# Patient Record
Sex: MALE | Race: WHITE | NOT HISPANIC OR LATINO | Employment: OTHER | URBAN - METROPOLITAN AREA
[De-identification: names, ages, dates, MRNs, and addresses within clinical notes are randomized per-mention and may not be internally consistent; named-entity substitution may affect disease eponyms.]

---

## 2017-01-15 ENCOUNTER — HOSPITAL ENCOUNTER (OUTPATIENT)
Facility: MEDICAL CENTER | Age: 82
End: 2017-01-18
Attending: EMERGENCY MEDICINE | Admitting: INTERNAL MEDICINE
Payer: MEDICARE

## 2017-01-15 ENCOUNTER — RESOLUTE PROFESSIONAL BILLING HOSPITAL PROF FEE (OUTPATIENT)
Dept: HOSPITALIST | Facility: MEDICAL CENTER | Age: 82
End: 2017-01-15
Payer: MEDICARE

## 2017-01-15 ENCOUNTER — APPOINTMENT (OUTPATIENT)
Dept: RADIOLOGY | Facility: MEDICAL CENTER | Age: 82
End: 2017-01-15
Attending: EMERGENCY MEDICINE
Payer: MEDICARE

## 2017-01-15 DIAGNOSIS — R53.1 GENERALIZED WEAKNESS: ICD-10-CM

## 2017-01-15 DIAGNOSIS — R10.84 GENERALIZED ABDOMINAL PAIN: ICD-10-CM

## 2017-01-15 DIAGNOSIS — K59.00 CONSTIPATION, UNSPECIFIED CONSTIPATION TYPE: ICD-10-CM

## 2017-01-15 PROBLEM — E87.1 HYPONATREMIA: Status: ACTIVE | Noted: 2017-01-15

## 2017-01-15 PROBLEM — E86.0 DEHYDRATION: Status: ACTIVE | Noted: 2017-01-15

## 2017-01-15 LAB
ALBUMIN SERPL BCP-MCNC: 4.3 G/DL (ref 3.2–4.9)
ALBUMIN/GLOB SERPL: 1.3 G/DL
ALP SERPL-CCNC: 59 U/L (ref 30–99)
ALT SERPL-CCNC: 12 U/L (ref 2–50)
ANION GAP SERPL CALC-SCNC: 9 MMOL/L (ref 0–11.9)
AST SERPL-CCNC: 19 U/L (ref 12–45)
BASOPHILS # BLD AUTO: 0.3 % (ref 0–1.8)
BASOPHILS # BLD: 0.03 K/UL (ref 0–0.12)
BILIRUB SERPL-MCNC: 1.2 MG/DL (ref 0.1–1.5)
BUN SERPL-MCNC: 28 MG/DL (ref 8–22)
CALCIUM SERPL-MCNC: 9.8 MG/DL (ref 8.5–10.5)
CHLORIDE SERPL-SCNC: 100 MMOL/L (ref 96–112)
CO2 SERPL-SCNC: 25 MMOL/L (ref 20–33)
CREAT SERPL-MCNC: 1.2 MG/DL (ref 0.5–1.4)
EOSINOPHIL # BLD AUTO: 0.14 K/UL (ref 0–0.51)
EOSINOPHIL NFR BLD: 1.5 % (ref 0–6.9)
ERYTHROCYTE [DISTWIDTH] IN BLOOD BY AUTOMATED COUNT: 44.7 FL (ref 35.9–50)
EST. AVERAGE GLUCOSE BLD GHB EST-MCNC: 174 MG/DL
GFR SERPL CREATININE-BSD FRML MDRD: 57 ML/MIN/1.73 M 2
GLOBULIN SER CALC-MCNC: 3.2 G/DL (ref 1.9–3.5)
GLUCOSE BLD-MCNC: 100 MG/DL (ref 65–99)
GLUCOSE SERPL-MCNC: 171 MG/DL (ref 65–99)
HBA1C MFR BLD: 7.7 % (ref 0–5.6)
HCT VFR BLD AUTO: 38.8 % (ref 42–52)
HGB BLD-MCNC: 12.7 G/DL (ref 14–18)
IMM GRANULOCYTES # BLD AUTO: 0.04 K/UL (ref 0–0.11)
IMM GRANULOCYTES NFR BLD AUTO: 0.4 % (ref 0–0.9)
LACTATE BLD-SCNC: 2.1 MMOL/L (ref 0.5–2)
LIPASE SERPL-CCNC: 44 U/L (ref 11–82)
LYMPHOCYTES # BLD AUTO: 1.34 K/UL (ref 1–4.8)
LYMPHOCYTES NFR BLD: 14 % (ref 22–41)
MCH RBC QN AUTO: 29.7 PG (ref 27–33)
MCHC RBC AUTO-ENTMCNC: 32.7 G/DL (ref 33.7–35.3)
MCV RBC AUTO: 90.7 FL (ref 81.4–97.8)
MONOCYTES # BLD AUTO: 0.86 K/UL (ref 0–0.85)
MONOCYTES NFR BLD AUTO: 9 % (ref 0–13.4)
NEUTROPHILS # BLD AUTO: 7.18 K/UL (ref 1.82–7.42)
NEUTROPHILS NFR BLD: 74.8 % (ref 44–72)
NRBC # BLD AUTO: 0 K/UL
NRBC BLD AUTO-RTO: 0 /100 WBC
PLATELET # BLD AUTO: 177 K/UL (ref 164–446)
PMV BLD AUTO: 10.7 FL (ref 9–12.9)
POTASSIUM SERPL-SCNC: 4.5 MMOL/L (ref 3.6–5.5)
PROT SERPL-MCNC: 7.5 G/DL (ref 6–8.2)
RBC # BLD AUTO: 4.28 M/UL (ref 4.7–6.1)
SODIUM SERPL-SCNC: 134 MMOL/L (ref 135–145)
TROPONIN I SERPL-MCNC: 0.02 NG/ML (ref 0–0.04)
TSH SERPL DL<=0.005 MIU/L-ACNC: 5.17 UIU/ML (ref 0.3–3.7)
WBC # BLD AUTO: 9.6 K/UL (ref 4.8–10.8)

## 2017-01-15 PROCEDURE — 99285 EMERGENCY DEPT VISIT HI MDM: CPT

## 2017-01-15 PROCEDURE — 82962 GLUCOSE BLOOD TEST: CPT

## 2017-01-15 PROCEDURE — A9270 NON-COVERED ITEM OR SERVICE: HCPCS | Performed by: INTERNAL MEDICINE

## 2017-01-15 PROCEDURE — 700112 HCHG RX REV CODE 229: Performed by: INTERNAL MEDICINE

## 2017-01-15 PROCEDURE — 80053 COMPREHEN METABOLIC PANEL: CPT

## 2017-01-15 PROCEDURE — 36415 COLL VENOUS BLD VENIPUNCTURE: CPT

## 2017-01-15 PROCEDURE — G0378 HOSPITAL OBSERVATION PER HR: HCPCS

## 2017-01-15 PROCEDURE — 74177 CT ABD & PELVIS W/CONTRAST: CPT

## 2017-01-15 PROCEDURE — 81003 URINALYSIS AUTO W/O SCOPE: CPT

## 2017-01-15 PROCEDURE — 700105 HCHG RX REV CODE 258: Performed by: EMERGENCY MEDICINE

## 2017-01-15 PROCEDURE — 85025 COMPLETE CBC W/AUTO DIFF WBC: CPT

## 2017-01-15 PROCEDURE — 700111 HCHG RX REV CODE 636 W/ 250 OVERRIDE (IP): Performed by: EMERGENCY MEDICINE

## 2017-01-15 PROCEDURE — 96375 TX/PRO/DX INJ NEW DRUG ADDON: CPT

## 2017-01-15 PROCEDURE — 700111 HCHG RX REV CODE 636 W/ 250 OVERRIDE (IP): Performed by: INTERNAL MEDICINE

## 2017-01-15 PROCEDURE — 84484 ASSAY OF TROPONIN QUANT: CPT

## 2017-01-15 PROCEDURE — 83605 ASSAY OF LACTIC ACID: CPT

## 2017-01-15 PROCEDURE — 84443 ASSAY THYROID STIM HORMONE: CPT

## 2017-01-15 PROCEDURE — 83036 HEMOGLOBIN GLYCOSYLATED A1C: CPT

## 2017-01-15 PROCEDURE — 700102 HCHG RX REV CODE 250 W/ 637 OVERRIDE(OP): Performed by: INTERNAL MEDICINE

## 2017-01-15 PROCEDURE — 99220 PR INITIAL OBSERVATION CARE,LEVL III: CPT | Mod: AI | Performed by: INTERNAL MEDICINE

## 2017-01-15 PROCEDURE — 83690 ASSAY OF LIPASE: CPT

## 2017-01-15 PROCEDURE — 96361 HYDRATE IV INFUSION ADD-ON: CPT

## 2017-01-15 PROCEDURE — 96374 THER/PROPH/DIAG INJ IV PUSH: CPT

## 2017-01-15 PROCEDURE — 700105 HCHG RX REV CODE 258: Performed by: INTERNAL MEDICINE

## 2017-01-15 RX ORDER — INSULIN GLARGINE 100 [IU]/ML
8 INJECTION, SOLUTION SUBCUTANEOUS
Status: ON HOLD | COMMUNITY
End: 2017-01-18

## 2017-01-15 RX ORDER — AMOXICILLIN 250 MG
1 CAPSULE ORAL
Status: DISCONTINUED | OUTPATIENT
Start: 2017-01-15 | End: 2017-01-18 | Stop reason: HOSPADM

## 2017-01-15 RX ORDER — DOCUSATE SODIUM 100 MG/1
100 CAPSULE, LIQUID FILLED ORAL 2 TIMES DAILY
Qty: 60 CAP | Refills: 0 | Status: SHIPPED | OUTPATIENT
Start: 2017-01-15 | End: 2017-01-18

## 2017-01-15 RX ORDER — ONDANSETRON 4 MG/1
4 TABLET, ORALLY DISINTEGRATING ORAL EVERY 4 HOURS PRN
Status: DISCONTINUED | OUTPATIENT
Start: 2017-01-15 | End: 2017-01-18 | Stop reason: HOSPADM

## 2017-01-15 RX ORDER — ACETAMINOPHEN 325 MG/1
650 TABLET ORAL EVERY 6 HOURS PRN
Status: DISCONTINUED | OUTPATIENT
Start: 2017-01-15 | End: 2017-01-18 | Stop reason: HOSPADM

## 2017-01-15 RX ORDER — TAMSULOSIN HYDROCHLORIDE 0.4 MG/1
0.4 CAPSULE ORAL
COMMUNITY

## 2017-01-15 RX ORDER — SODIUM CHLORIDE 9 MG/ML
1000 INJECTION, SOLUTION INTRAVENOUS ONCE
Status: COMPLETED | OUTPATIENT
Start: 2017-01-15 | End: 2017-01-15

## 2017-01-15 RX ORDER — AMOXICILLIN 250 MG
1 CAPSULE ORAL NIGHTLY
Status: DISCONTINUED | OUTPATIENT
Start: 2017-01-15 | End: 2017-01-18 | Stop reason: HOSPADM

## 2017-01-15 RX ORDER — LACTULOSE 20 G/30ML
30 SOLUTION ORAL
Status: DISCONTINUED | OUTPATIENT
Start: 2017-01-15 | End: 2017-01-18 | Stop reason: HOSPADM

## 2017-01-15 RX ORDER — OMEPRAZOLE 20 MG/1
20 CAPSULE, DELAYED RELEASE ORAL DAILY
Status: DISCONTINUED | OUTPATIENT
Start: 2017-01-15 | End: 2017-01-18 | Stop reason: HOSPADM

## 2017-01-15 RX ORDER — DOCUSATE SODIUM 100 MG/1
100 CAPSULE, LIQUID FILLED ORAL EVERY MORNING
Status: DISCONTINUED | OUTPATIENT
Start: 2017-01-15 | End: 2017-01-18 | Stop reason: HOSPADM

## 2017-01-15 RX ORDER — IBUPROFEN 800 MG/1
800 TABLET ORAL EVERY 6 HOURS PRN
Status: DISCONTINUED | OUTPATIENT
Start: 2017-01-15 | End: 2017-01-16

## 2017-01-15 RX ORDER — SIMVASTATIN 20 MG
20 TABLET ORAL
COMMUNITY

## 2017-01-15 RX ORDER — SODIUM CHLORIDE 9 MG/ML
INJECTION, SOLUTION INTRAVENOUS CONTINUOUS
Status: DISCONTINUED | OUTPATIENT
Start: 2017-01-15 | End: 2017-01-17

## 2017-01-15 RX ORDER — ONDANSETRON 2 MG/ML
4 INJECTION INTRAMUSCULAR; INTRAVENOUS EVERY 4 HOURS PRN
Status: DISCONTINUED | OUTPATIENT
Start: 2017-01-15 | End: 2017-01-18 | Stop reason: HOSPADM

## 2017-01-15 RX ORDER — BISACODYL 10 MG
10 SUPPOSITORY, RECTAL RECTAL
Status: DISCONTINUED | OUTPATIENT
Start: 2017-01-15 | End: 2017-01-18 | Stop reason: HOSPADM

## 2017-01-15 RX ORDER — MORPHINE SULFATE 4 MG/ML
4 INJECTION, SOLUTION INTRAMUSCULAR; INTRAVENOUS
Status: DISCONTINUED | OUTPATIENT
Start: 2017-01-15 | End: 2017-01-15

## 2017-01-15 RX ORDER — ENEMA 19; 7 G/133ML; G/133ML
1 ENEMA RECTAL
Status: DISCONTINUED | OUTPATIENT
Start: 2017-01-15 | End: 2017-01-18 | Stop reason: HOSPADM

## 2017-01-15 RX ORDER — DEXTROSE MONOHYDRATE 25 G/50ML
25 INJECTION, SOLUTION INTRAVENOUS
Status: DISCONTINUED | OUTPATIENT
Start: 2017-01-15 | End: 2017-01-18

## 2017-01-15 RX ORDER — ONDANSETRON 2 MG/ML
4 INJECTION INTRAMUSCULAR; INTRAVENOUS ONCE
Status: COMPLETED | OUTPATIENT
Start: 2017-01-15 | End: 2017-01-15

## 2017-01-15 RX ORDER — CLOPIDOGREL BISULFATE 75 MG/1
75 TABLET ORAL DAILY
COMMUNITY

## 2017-01-15 RX ADMIN — OMEPRAZOLE 20 MG: 20 CAPSULE, DELAYED RELEASE ORAL at 22:57

## 2017-01-15 RX ADMIN — DOCUSATE SODIUM 100 MG: 100 CAPSULE ORAL at 22:57

## 2017-01-15 RX ADMIN — SODIUM CHLORIDE 1000 ML: 9 INJECTION, SOLUTION INTRAVENOUS at 14:13

## 2017-01-15 RX ADMIN — SODIUM CHLORIDE: 9 INJECTION, SOLUTION INTRAVENOUS at 20:55

## 2017-01-15 RX ADMIN — MORPHINE SULFATE 4 MG: 4 INJECTION INTRAVENOUS at 14:13

## 2017-01-15 RX ADMIN — ENOXAPARIN SODIUM 40 MG: 100 INJECTION SUBCUTANEOUS at 21:17

## 2017-01-15 RX ADMIN — STANDARDIZED SENNA CONCENTRATE AND DOCUSATE SODIUM 1 TABLET: 8.6; 5 TABLET, FILM COATED ORAL at 22:58

## 2017-01-15 RX ADMIN — ONDANSETRON 4 MG: 2 INJECTION, SOLUTION INTRAMUSCULAR; INTRAVENOUS at 14:13

## 2017-01-15 ASSESSMENT — PAIN SCALES - GENERAL
PAINLEVEL_OUTOF10: 4
PAINLEVEL_OUTOF10: 4

## 2017-01-15 NOTE — ED PROVIDER NOTES
"ED Provider Note    CHIEF COMPLAINT  Chief Complaint   Patient presents with   • LUQ Pain       HPI  Herve Bacon is a 89 y.o. male who presents with abdominal pain, unclear onset of the patient describes intermittent pain in the past couple months with a couple different visits the hospital, initially had diarrhea and then he reports that he was \"blocked up.\" Currently he states his pain is better, it seemingly was located in the left upper quadrant. Last bowel movement was yesterday and otherwise unremarkable. He did vomit once. Denies fever, no chest pain, back pain or shortness of breath.    REVIEW OF SYSTEMS  Negative for fever, rash, chest pain, dyspnea, headache, focal weakness, focal numbness, focal tingling, back pain. All other systems are negative.     PAST MEDICAL HISTORY  No past medical history on file.    FAMILY HISTORY  No family history on file.    SOCIAL HISTORY  Social History   Substance Use Topics   • Smoking status: Not on file   • Smokeless tobacco: Not on file   • Alcohol Use: Not on file       SURGICAL HISTORY  No past surgical history on file.    CURRENT MEDICATIONS  I personally reviewed the medication list in the charting documentation.     ALLERGIES  Allergies   Allergen Reactions   • Codeine        MEDICAL RECORD  I have reviewed patient's medical record and pertinent results are listed above.      PHYSICAL EXAM  VITAL SIGNS: /60 mmHg  Pulse 57  Temp(Src) 36.6 °C (97.9 °F)  Resp 18  Ht 1.803 m (5' 11\")  Wt 78.019 kg (172 lb)  BMI 24.00 kg/m2  SpO2 95%   Constitutional: Well appearing patient in no acute distress.  Not toxic, nor ill in appearance.  HENT: Mucus membranes moist.    Eyes: No scleral icterus. Normal conjunctiva   Neck: Supple, comfortable, nonpainful range of motion.   Cardiovascular: Regular heart rate and rhythm.   Thorax & Lungs: Chest is nontender.  Lungs are clear to auscultation with good air movement bilaterally.  No wheeze, rhonchi, nor rales. "   Abdomen: Soft, no obvious distention, exquisite tenderness with local guarding in the upper abdomen noted.  Skin: Warm, dry. No rash appreciated  Extremities/Musculoskeletal: No sign of trauma. No asymmetric calf tenderness, erythema or edema. Normal range of motion   Neurologic: Alert & oriented. No focal deficits observed.   Psychiatric: Normal affect appropriate for the clinical situation.    DIAGNOSTIC STUDIES / PROCEDURES    LABS  Results for orders placed or performed during the hospital encounter of 01/15/17   CBC WITH DIFFERENTIAL   Result Value Ref Range    WBC 9.6 4.8 - 10.8 K/uL    RBC 4.28 (L) 4.70 - 6.10 M/uL    Hemoglobin 12.7 (L) 14.0 - 18.0 g/dL    Hematocrit 38.8 (L) 42.0 - 52.0 %    MCV 90.7 81.4 - 97.8 fL    MCH 29.7 27.0 - 33.0 pg    MCHC 32.7 (L) 33.7 - 35.3 g/dL    RDW 44.7 35.9 - 50.0 fL    Platelet Count 177 164 - 446 K/uL    MPV 10.7 9.0 - 12.9 fL    Neutrophils-Polys 74.80 (H) 44.00 - 72.00 %    Lymphocytes 14.00 (L) 22.00 - 41.00 %    Monocytes 9.00 0.00 - 13.40 %    Eosinophils 1.50 0.00 - 6.90 %    Basophils 0.30 0.00 - 1.80 %    Immature Granulocytes 0.40 0.00 - 0.90 %    Nucleated RBC 0.00 /100 WBC    Neutrophils (Absolute) 7.18 1.82 - 7.42 K/uL    Lymphs (Absolute) 1.34 1.00 - 4.80 K/uL    Monos (Absolute) 0.86 (H) 0.00 - 0.85 K/uL    Eos (Absolute) 0.14 0.00 - 0.51 K/uL    Baso (Absolute) 0.03 0.00 - 0.12 K/uL    Immature Granulocytes (abs) 0.04 0.00 - 0.11 K/uL    NRBC (Absolute) 0.00 K/uL   COMP METABOLIC PANEL   Result Value Ref Range    Sodium 134 (L) 135 - 145 mmol/L    Potassium 4.5 3.6 - 5.5 mmol/L    Chloride 100 96 - 112 mmol/L    Co2 25 20 - 33 mmol/L    Anion Gap 9.0 0.0 - 11.9    Glucose 171 (H) 65 - 99 mg/dL    Bun 28 (H) 8 - 22 mg/dL    Creatinine 1.20 0.50 - 1.40 mg/dL    Calcium 9.8 8.5 - 10.5 mg/dL    AST(SGOT) 19 12 - 45 U/L    ALT(SGPT) 12 2 - 50 U/L    Alkaline Phosphatase 59 30 - 99 U/L    Total Bilirubin 1.2 0.1 - 1.5 mg/dL    Albumin 4.3 3.2 - 4.9 g/dL     Total Protein 7.5 6.0 - 8.2 g/dL    Globulin 3.2 1.9 - 3.5 g/dL    A-G Ratio 1.3 g/dL   LIPASE   Result Value Ref Range    Lipase 44 11 - 82 U/L   TROPONIN   Result Value Ref Range    Troponin I 0.02 0.00 - 0.04 ng/mL   LACTIC ACID   Result Value Ref Range    Lactic Acid 2.1 (H) 0.5 - 2.0 mmol/L   ESTIMATED GFR   Result Value Ref Range    GFR If African American >60 >60 mL/min/1.73 m 2    GFR If Non  57 (A) >60 mL/min/1.73 m 2         RADIOLOGY  CT-ABDOMEN-PELVIS WITH   Final Result      1.  No acute intra-abdominal findings.      2.  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      3.  Large amount of stool in the rectal vault.      4.  Atherosclerosis.      5.  Diverticulosis.      6.  Bilateral peribronchial thickening and mucous plugging in the lower lobes, consistent with a chronic infectious or inflammatory process.            COURSE & MEDICAL DECISION MAKING  I have reviewed any medical record information, laboratory studies and radiographic results as noted above.  Differential diagnoses includes: Bowel obstruction, perforated viscus, bowel ischemia, constipation, dehydration, anemia, UTI    Encounter Summary: This is a 89 y.o. male with abdominal pain with exquisite tenderness on exam, vital signs are unremarkable, patient looks quite well at rest, and is otherwise unremarkable. Will check blood work including a lactic acid and a CT of the abdomen and pelvis and then reevaluate ----- blood work is largely unremarkable, CT scan reveals a large amount of stool near the rectum, rectal examination revealed stool that was just out of reach however. Patient was given an enema and did have a large bowel movement. He felt better from an abdominal pain standpoint however upon discharge the patient states he is too weak to ambulate and go home safely so he has been admitted to the hospital for further evaluation.      DISPOSITION: Admitted  in guarded condition      FINAL IMPRESSION  1. Constipation, unspecified constipation type    2. Generalized abdominal pain    3. Generalized weakness           This dictation was created using voice recognition software. The accuracy of the dictation is limited to the abilities of the software. I expect there may be some errors of grammar and possibly content. The nursing notes were reviewed and certain aspects of this information were incorporated into this note.    Electronically signed by: Javed Mcintyre, 1/15/2017 2:07 PM

## 2017-01-15 NOTE — LETTER
[facility] ([facility]) - 0939  EHR eReferral Notification Requirements    To be sent by secure email to [email or web address] or   by fax to [phone]       FIELDS ARE REQUIRED TO BE COMPLETED     Patient Name:  Herve Bacon  MRN:  [id number]   Account Number: [id number]                YOB: 1925    Date Roomed in ED:    1/15/2017   12:28 PM  Date First Observation Order Placed: 1/15/2017   7:48 PM  Date First Inpatient Order Placed:        Date of Previous Admission (Needed For Readmission Reviews Only):     Discharge Date and Time (if applicable): No discharge date for patient encounter.     PLEASE CHECK OFF TYPE OF REVIEW & CURRENT ADMISSION STATUS FROM LISTS BELOW  Type of Review:  Admission review    Dates to be Reviewed:  1/16/2017        Current Admission Status:  Observation-Outpatient    / Contact Number for EHR outcome/recommendation call:    Leidy [phone]     Attending Physician/ Contact Number (if not the same as in electronic record):  Dr Ivette Olvera [phone]     Comments:  Please reviwew for inpt status      Additional Information being Emailed or Faxed:  yes  , will fax clinical info    Fax Handwritten Supporting Documents to EHR at [phone]      [address] [address]  [phone]  [email or web address]    Updated December 17, 2014

## 2017-01-15 NOTE — IP AVS SNAPSHOT
Shopsense Access Code: [email or web address]  Expires: 2/17/2017  3:36 PM    Your email address is not on file at [employer].  Email Addresses are required for you to sign up for Shopsense, please contact [phone] to verify your personal information and to provide your email address prior to attempting to register for Shopsense.    Herve [place]UMONT [place] [address] [place] [address]    [employer]  A secure, online tool to manage your health information     [employer]’s Shopsense® is a secure, online tool that connects you to your personalized health information from the privacy of your home -- day or night - making it very easy for you to manage your healthcare. Once the activation process is completed, you can even access your medical information using the Shopsense nimisha, which is available for free in the Apple Nimisha store or Google Play store.     To learn more about Shopsense, visit [email or web address]    There are two levels of access available (as shown below):   My Chart Features  [facility] Primary Care Doctor [facility]  Specialists [facility]  Urgent  Care Non-[facility] hospital Primary Care Doctor   Email your healthcare team securely and privately 24/7 X X X    Manage appointments: schedule your next appointment; view details of past/upcoming appointments X      Request prescription refills. X      View recent personal medical records, including lab and immunizations X X X X   View health record, including health history, allergies, medications X X X X   Read reports about your outpatient visits, procedures, consult and ER notes X X X X   See your discharge summary, which is a recap of your hospital and/or ER visit that includes your diagnosis, lab results, and care plan X X  X     How to register for [employer]:  Once your e-mail address has been verified, follow the following steps to sign up for [employer].     1. Go to  [email or web address]  2. Click on the Sign Up Now box, which takes you to the  New Member Sign Up page. You will need to provide the following information:  a. Enter your [employer] Access Code exactly as it appears at the top of this page. (You will not need to use this code after you’ve completed the sign-up process. If you do not sign up before the expiration date, you must request a new code.)   b. Enter your date of birth.   c. Enter your home email address.   d. Click Submit, and follow the next screen’s instructions.  3. Create a [employer] ID. This will be your [employer] login ID and cannot be changed, so think of one that is secure and easy to remember.  4. Create a [employer] password. You can change your password at any time.  5. Enter your Password Reset Question and Answer. This can be used at a later time if you forget your password.   6. Enter your e-mail address. This allows you to receive e-mail notifications when new information is available in [employer].  7. Click Sign Up. You can now view your health information.    For assistance activating your [employer] account, call [phone]

## 2017-01-15 NOTE — IP AVS SNAPSHOT
" After Visit Summary                                                                                                                  Name:Herve Bacon  Medical Record Number:[id number]  CSN: [id number]    YOB: 1925   Age: 89 y.o.  Sex: male  HT:1.803 m (5' 11\") WT: 78.019 kg (172 lb)          Admit Date: 1/15/2017     Discharge Date:   Today's Date: 1/18/2017  Attending Doctor:  Lianet Patel M.D.                  Allergies:  Codeine            Discharge Instructions       Return immediately to the Emergency Department if you experience continuing or worsening discomfort in your abdomen, fever, chest pain, difficulty breathing, back pain, or any other new or worsening symptoms.       Constipation, Adult  Constipation is when a person has fewer than three bowel movements a week, has difficulty having a bowel movement, or has stools that are dry, hard, or larger than normal. As people grow older, constipation is more common. A low-fiber diet, not taking in enough fluids, and taking certain medicines may make constipation worse.   CAUSES   · Certain medicines, such as antidepressants, pain medicine, iron supplements, antacids, and water pills.    · Certain diseases, such as diabetes, irritable bowel syndrome (IBS), thyroid disease, or depression.    · Not drinking enough water.    · Not eating enough fiber-rich foods.    · Stress or travel.    · Lack of physical activity or exercise.    · Ignoring the urge to have a bowel movement.    · Using laxatives too much.    SIGNS AND SYMPTOMS   1. Having fewer than three bowel movements a week.    2. Straining to have a bowel movement.    3. Having stools that are hard, dry, or larger than normal.    4. Feeling full or bloated.    5. Pain in the lower abdomen.    6. Not feeling relief after having a bowel movement.    DIAGNOSIS   Your health care provider will take a medical history and perform a physical exam. Further testing may be done for severe constipation. " Some tests may include:  1. A barium enema X-ray to examine your rectum, colon, and, sometimes, your small intestine.    2. A sigmoidoscopy to examine your lower colon.    3. A colonoscopy to examine your entire colon.  TREATMENT   Treatment will depend on the severity of your constipation and what is causing it. Some dietary treatments include drinking more fluids and eating more fiber-rich foods. Lifestyle treatments may include regular exercise. If these diet and lifestyle recommendations do not help, your health care provider may recommend taking over-the-counter laxative medicines to help you have bowel movements. Prescription medicines may be prescribed if over-the-counter medicines do not work.   HOME CARE INSTRUCTIONS   1. Eat foods that have a lot of fiber, such as fruits, vegetables, whole grains, and beans.  2. Limit foods high in fat and processed sugars, such as french fries, hamburgers, cookies, candies, and soda.    3. A fiber supplement may be added to your diet if you cannot get enough fiber from foods.    4. Drink enough fluids to keep your urine clear or pale yellow.    5. Exercise regularly or as directed by your health care provider.    6. Go to the restroom when you have the urge to go. Do not hold it.    7. Only take over-the-counter or prescription medicines as directed by your health care provider. Do not take other medicines for constipation without talking to your health care provider first.    SEEK IMMEDIATE MEDICAL CARE IF:   1. You have bright red blood in your stool.    2. Your constipation lasts for more than 4 days or gets worse.    3. You have abdominal or rectal pain.    4. You have thin, pencil-like stools.    5. You have unexplained weight loss.  MAKE SURE YOU:   · Understand these instructions.  · Will watch your condition.  · Will get help right away if you are not doing well or get worse.     This information is not intended to replace advice given to you by your health care  provider. Make sure you discuss any questions you have with your health care provider.     Document Released: 09/15/2005 Document Revised: 01/08/2016 Document Reviewed: 09/29/2014  [employer] Interactive Patient Education ©2016 [employer]  Discharge Instructions    Discharged to other by medical transportation with escort. Discharged via wheelchair, hospital escort: Yes.  Special equipment needed: Not Applicable    Be sure to schedule a follow-up appointment with your primary care doctor or any specialists as instructed.     Discharge Plan:   Influenza Vaccine Indication: Not indicated: Previously immunized this influenza season and > 8 years of age (recieved 11/01/2016)    I understand that a diet low in cholesterol, fat, and sodium is recommended for good health. Unless I have been given specific instructions below for another diet, I accept this instruction as my diet prescription.   Other diet: Diabetic, high fiber    Special Instructions: None    · Is patient discharged on Warfarin / Coumadin?   No     · Is patient Post Blood Transfusion?  No    Depression / Suicide Risk    As you are discharged from this [facility] facility, it is important to learn how to keep safe from harming yourself.    Recognize the warning signs:  · Abrupt changes in personality, positive or negative- including increase in energy   · Giving away possessions  · Change in eating patterns- significant weight changes-  positive or negative  · Change in sleeping patterns- unable to sleep or sleeping all the time   · Unwillingness or inability to communicate  · Depression  · Unusual sadness, discouragement and loneliness  · Talk of wanting to die  · Neglect of personal appearance   · Rebelliousness- reckless behavior  · Withdrawal from people/activities they love  · Confusion- inability to concentrate     If you or a loved one observes any of these behaviors or has concerns about self-harm, here's what you can do:  · Talk about it- your  feelings and reasons for harming yourself  · Remove any means that you might use to hurt yourself (examples: pills, rope, extension cords, firearm)  · Get professional help from the community (Mental Health, Substance Abuse, psychological counseling)  · Do not be alone:Call your Safe Contact- someone whom you trust who will be there for you.  · Call your local CRISIS HOTLINE 207-3325 or 981-328-6876  · Call your local Children's Mobile Crisis Response Team [place] (199) 927-6490 or wwwImagine Health  · Call the toll free National Suicide Prevention Hotlines   · National Suicide Prevention Lifeline 043-277-QICI (3159)  · [employer] Hope Line Network 800-SUICIDE (596-3436)        Follow-up Information     1. Follow up with [facility], Emergency Dept.    Specialty:  Emergency Medicine    Why:  As needed    Contact information    [address] [facility] [address] [phone]         Discharge Medication Instructions:    Below are the medications your physician expects you to take upon discharge:    Review all your home medications and newly ordered medications with your doctor and/or pharmacist. Follow medication instructions as directed by your doctor and/or pharmacist.    Please keep your medication list with you and share with your physician.               Medication List      START taking these medications        Instructions    acetaminophen 325 MG Tabs   Last time this was given:  1,000 mg on 1/18/2017  8:30 AM   Commonly known as:  TYLENOL    Take 2 Tabs by mouth every 6 hours as needed (Mild Pain; (Pain scale 1-3); Temp greater than 100.5 F).   Dose:  650 mg       bisacodyl 10 MG Supp   Last time this was given:  10 mg on 1/16/2017 10:50 AM   Commonly known as:  DULCOLAX    Insert 1 Suppository in rectum every 24 hours as needed (if lactulose ineffective).   Dose:  10 mg        MG Caps   Last time this was given:  100 mg on 1/18/2017  8:30 AM    Take 100 mg by mouth  every morning.   Dose:  100 mg       lactulose 20 GM/30ML Soln    Take 30 mL by mouth every 24 hours as needed (if sennosides-docusate sodium (SENOKOT-S) ineffective).   Dose:  30 mL       levothyroxine 50 MCG Tabs   Last time this was given:  50 mcg on 1/18/2017  5:50 AM   Commonly known as:  SYNTHROID    Take 1 Tab by mouth Every morning on an empty stomach.   Dose:  50 mcg       polyethylene glycol/lytes Pack   Last time this was given:  1 Packet on 1/18/2017  8:37 AM   Commonly known as:  MIRALAX    Take 1 Packet by mouth every day.   Dose:  17 g         CHANGE how you take these medications        Instructions    insulin glargine 100 UNIT/ML Soln   What changed:  how much to take   Commonly known as:  LANTUS    Inject 5 Units as instructed every bedtime.   Dose:  5 Units         CONTINUE taking these medications        Instructions    clopidogrel 75 MG Tabs   Last time this was given:  75 mg on 1/18/2017  8:30 AM   Commonly known as:  PLAVIX    Take 75 mg by mouth every day.   Dose:  75 mg       metoprolol 25 MG Tabs   Last time this was given:  12.5 mg on 1/16/2017  8:19 AM   Commonly known as:  LOPRESSOR    Take 12.5 mg by mouth 2 times a day.   Dose:  12.5 mg       simvastatin 20 MG Tabs   Last time this was given:  20 mg on 1/17/2017 10:10 PM   Commonly known as:  ZOCOR    Take 20 mg by mouth every bedtime.   Dose:  20 mg       tamsulosin 0.4 MG capsule   Last time this was given:  0.4 mg on 1/18/2017  8:29 AM   Commonly known as:  FLOMAX    Take 0.4 mg by mouth ONE-HALF HOUR AFTER BREAKFAST.   Dose:  0.4 mg               Instructions           Diet / Nutrition:    Follow any diet instructions given to you by your doctor or the dietician, including how much salt (sodium) you are allowed each day.    If you are overweight, talk to your doctor about a weight reduction plan.    Activity:    Remain physically active following your doctor's instructions about exercise and activity.    Rest often.     Any time  you become even a little tired or short of breath, SIT DOWN and rest.    Worsening Symptoms:    Report any of the following signs and symptoms to the doctor's office immediately:    *Pain of jaw, arm, or neck  *Chest pain not relieved by medication                               *Dizziness or loss of consciousness  *Difficulty breathing even when at rest   *More tired than usual                                       *Bleeding drainage or swelling of surgical site  *Swelling of feet, ankles, legs or stomach                 *Fever (>100ºF)  *Pink or blood tinged sputum  *Weight gain (3lbs/day or 5lbs /week)           *Shock from internal defibrillator (if applicable)  *Palpitations or irregular heartbeats                *Cool and/or numb extremities    Stroke Awareness    Common Risk Factors for Stroke include:    Age  Atrial Fibrillation  Carotid Artery Stenosis  Diabetes Mellitus  Excessive alcohol consumption  High blood pressure  Overweight   Physical inactivity  Smoking    Warning signs and symptoms of a stroke include:    *Sudden numbness or weakness of the face, arm or leg (especially on one side of the body).  *Sudden confusion, trouble speaking or understanding.  *Sudden trouble seeing in one or both eyes.  *Sudden trouble walking, dizziness, loss of balance or coordination.Sudden severe headache with no known cause.    It is very important to get treatment quickly when a stroke occurs. If you experience any of the above warning signs, call 911 immediately.                   Disclaimer         Quit Smoking / Tobacco Use:    I understand the use of any tobacco products increases my chance of suffering from future heart disease or stroke and could cause other illnesses which may shorten my life. Quitting the use of tobacco products is the single most important thing I can do to improve my health. For further information on smoking / tobacco cessation call a Toll Free Quit Line at 1-876.458.2449 (*National Cancer  [place]) or 1-353.183.9395 (American Lung Association) or you can access the web based program at www.lungusa.org.    [place] Tobacco Users Help Line:  (564) 251-9736       Toll Free: 1-330.116.3674  Quit Tobacco Program [facility] (638)662-9547    Crisis Hotline:    [place] Crisis Hotline:  9-237-KBLTUVA or 1-122.696.5650    [place] Crisis Hotline:    1-246.523.8020 or 428-282-3501    Discharge Survey:   Thank you for choosing [facility]. We hope we did everything we could to make your hospital stay a pleasant one. You may be receiving a phone survey and we would appreciate your time and participation in answering the questions. Your input is very valuable to us in our efforts to improve our service to our patients and their families.        My signature on this form indicates that:    1. I have reviewed and understand the above information.  2. My questions regarding this information have been answered to my satisfaction.  3. I have formulated a plan with my discharge nurse to obtain my prescribed medications for home.                  Disclaimer         __________________________________                     __________       ________                       Patient Signature                                                 Date                    Time

## 2017-01-15 NOTE — ED NOTES
Pt arrived via ems, per ems pt having ad pain for 1 month and was diagnosed with intestinal blockage last week. Pt c/o LUQ pain +tender to palpation.  U/a pt sitting up caox4 speaking in full sentences no distress, no sob, no cp,+abd pain

## 2017-01-15 NOTE — IP AVS SNAPSHOT
1/18/2017          Herve [facility]ron Of The [address] [place] [address]    Dear Herve:    [facility] wants to ensure your discharge home is safe and you or your loved ones have had all your questions answered regarding your care after you leave the hospital.    You may receive a telephone call within two days of your discharge.  This call is to make certain you understand your discharge instructions as well as ensure we provided you with the best care possible during your stay with us.     The call will only last approximately 3-5 minutes and will be done by a nurse.    Once again, we want to ensure your discharge home is safe and that you have a clear understanding of any next steps in your care.  If you have any questions or concerns, please do not hesitate to contact us, we are here for you.  Thank you for choosing [facility] for your healthcare needs.    Sincerely,    Neto Simpson    [facility]

## 2017-01-15 NOTE — IP AVS SNAPSHOT
" <p align=\"LEFT\"><IMG SRC=\"//[email or web address]\" alt=\"Image\" WIDTH=\"50%\" HEIGHT=\"200\" BORDER=\"\"></p>                   Name:Herve Bacon  Medical Record Number:[id number]  CSN: [id number]    YOB: 1925   Age: 89 y.o.  Sex: male  HT:1.803 m (5' 11\") WT: 78.019 kg (172 lb)          Admit Date: 1/15/2017     Discharge Date:   Today's Date: 1/18/2017  Attending Doctor:  Lianet Patel M.D.                  Allergies:  Codeine          Follow-up Information     1. Follow up with [facility], Emergency Dept.    Specialty:  Emergency Medicine    Why:  As needed    Contact information    [address] [place] [id number] [phone]         Medication List      Take these Medications        Instructions    acetaminophen 325 MG Tabs   Commonly known as:  TYLENOL    Take 2 Tabs by mouth every 6 hours as needed (Mild Pain; (Pain scale 1-3); Temp greater than 100.5 F).   Dose:  650 mg       bisacodyl 10 MG Supp   Commonly known as:  DULCOLAX    Insert 1 Suppository in rectum every 24 hours as needed (if lactulose ineffective).   Dose:  10 mg       clopidogrel 75 MG Tabs   Commonly known as:  PLAVIX    Take 75 mg by mouth every day.   Dose:  75 mg        MG Caps    Take 100 mg by mouth every morning.   Dose:  100 mg       insulin glargine 100 UNIT/ML Soln   What changed:  how much to take   Commonly known as:  LANTUS    Inject 5 Units as instructed every bedtime.   Dose:  5 Units       lactulose 20 GM/30ML Soln    Take 30 mL by mouth every 24 hours as needed (if sennosides-docusate sodium (SENOKOT-S) ineffective).   Dose:  30 mL       levothyroxine 50 MCG Tabs   Commonly known as:  SYNTHROID    Take 1 Tab by mouth Every morning on an empty stomach.   Dose:  50 mcg       metoprolol 25 MG Tabs   Commonly known as:  LOPRESSOR    Take 12.5 mg by mouth 2 times a day.   Dose:  12.5 mg       polyethylene glycol/lytes Pack   Commonly known as:  MIRALAX    Take 1 Packet by mouth every " day.   Dose:  17 g       simvastatin 20 MG Tabs   Commonly known as:  ZOCOR    Take 20 mg by mouth every bedtime.   Dose:  20 mg       tamsulosin 0.4 MG capsule   Commonly known as:  FLOMAX    Take 0.4 mg by mouth ONE-HALF HOUR AFTER BREAKFAST.   Dose:  0.4 mg

## 2017-01-16 PROBLEM — N18.30 CKD (CHRONIC KIDNEY DISEASE) STAGE 3, GFR 30-59 ML/MIN (HCC): Status: ACTIVE | Noted: 2017-01-16

## 2017-01-16 PROBLEM — I10 HTN (HYPERTENSION): Status: ACTIVE | Noted: 2017-01-16

## 2017-01-16 PROBLEM — R10.13 DYSPEPSIA: Status: ACTIVE | Noted: 2017-01-16

## 2017-01-16 PROBLEM — E03.9 HYPOTHYROIDISM: Status: ACTIVE | Noted: 2017-01-16

## 2017-01-16 PROBLEM — E11.9 TYPE 2 DIABETES MELLITUS (HCC): Status: ACTIVE | Noted: 2017-01-16

## 2017-01-16 LAB
ANION GAP SERPL CALC-SCNC: 5 MMOL/L (ref 0–11.9)
APPEARANCE UR: CLEAR
BILIRUB UR QL STRIP.AUTO: NEGATIVE
BUN SERPL-MCNC: 21 MG/DL (ref 8–22)
CALCIUM SERPL-MCNC: 9.1 MG/DL (ref 8.5–10.5)
CHLORIDE SERPL-SCNC: 103 MMOL/L (ref 96–112)
CHOLEST SERPL-MCNC: 113 MG/DL (ref 100–199)
CK SERPL-CCNC: 76 U/L (ref 0–154)
CO2 SERPL-SCNC: 26 MMOL/L (ref 20–33)
COLOR UR: NORMAL
CREAT SERPL-MCNC: 1.12 MG/DL (ref 0.5–1.4)
CULTURE IF INDICATED INDCX: NO UA CULTURE
ERYTHROCYTE [DISTWIDTH] IN BLOOD BY AUTOMATED COUNT: 45.7 FL (ref 35.9–50)
FLUAV H1 2009 PAND RNA SPEC QL NAA+PROBE: NOT DETECTED
FLUAV RNA SPEC QL NAA+PROBE: NEGATIVE
FLUBV RNA SPEC QL NAA+PROBE: NEGATIVE
GFR SERPL CREATININE-BSD FRML MDRD: >60 ML/MIN/1.73 M 2
GLUCOSE BLD-MCNC: 108 MG/DL (ref 65–99)
GLUCOSE BLD-MCNC: 116 MG/DL (ref 65–99)
GLUCOSE BLD-MCNC: 147 MG/DL (ref 65–99)
GLUCOSE BLD-MCNC: 153 MG/DL (ref 65–99)
GLUCOSE SERPL-MCNC: 100 MG/DL (ref 65–99)
GLUCOSE UR STRIP.AUTO-MCNC: NEGATIVE MG/DL
HCT VFR BLD AUTO: 37.7 % (ref 42–52)
HDLC SERPL-MCNC: 37 MG/DL
HGB BLD-MCNC: 12 G/DL (ref 14–18)
KETONES UR STRIP.AUTO-MCNC: NEGATIVE MG/DL
LACTATE BLD-SCNC: 1.3 MMOL/L (ref 0.5–2)
LDLC SERPL CALC-MCNC: 56 MG/DL
LEUKOCYTE ESTERASE UR QL STRIP.AUTO: NEGATIVE
MCH RBC QN AUTO: 29.1 PG (ref 27–33)
MCHC RBC AUTO-ENTMCNC: 31.8 G/DL (ref 33.7–35.3)
MCV RBC AUTO: 91.3 FL (ref 81.4–97.8)
MICRO URNS: NORMAL
MYOGLOBIN SERPL-MCNC: 178 NG/ML (ref 0–110)
NITRITE UR QL STRIP.AUTO: NEGATIVE
PH UR STRIP.AUTO: 5 [PH]
PLATELET # BLD AUTO: 158 K/UL (ref 164–446)
PMV BLD AUTO: 10.4 FL (ref 9–12.9)
POTASSIUM SERPL-SCNC: 4.2 MMOL/L (ref 3.6–5.5)
PROT UR QL STRIP: NEGATIVE MG/DL
RBC # BLD AUTO: 4.13 M/UL (ref 4.7–6.1)
RBC UR QL AUTO: NEGATIVE
SODIUM SERPL-SCNC: 134 MMOL/L (ref 135–145)
SP GR UR STRIP.AUTO: 1.02
TRIGL SERPL-MCNC: 102 MG/DL (ref 0–149)
WBC # BLD AUTO: 9.2 K/UL (ref 4.8–10.8)

## 2017-01-16 PROCEDURE — 700105 HCHG RX REV CODE 258: Performed by: INTERNAL MEDICINE

## 2017-01-16 PROCEDURE — 87502 INFLUENZA DNA AMP PROBE: CPT

## 2017-01-16 PROCEDURE — G8979 MOBILITY GOAL STATUS: HCPCS | Mod: CI

## 2017-01-16 PROCEDURE — A9270 NON-COVERED ITEM OR SERVICE: HCPCS | Performed by: INTERNAL MEDICINE

## 2017-01-16 PROCEDURE — G8988 SELF CARE GOAL STATUS: HCPCS | Mod: CI

## 2017-01-16 PROCEDURE — 80048 BASIC METABOLIC PNL TOTAL CA: CPT

## 2017-01-16 PROCEDURE — 700112 HCHG RX REV CODE 229: Performed by: INTERNAL MEDICINE

## 2017-01-16 PROCEDURE — 97165 OT EVAL LOW COMPLEX 30 MIN: CPT

## 2017-01-16 PROCEDURE — 96376 TX/PRO/DX INJ SAME DRUG ADON: CPT

## 2017-01-16 PROCEDURE — 83874 ASSAY OF MYOGLOBIN: CPT

## 2017-01-16 PROCEDURE — 700102 HCHG RX REV CODE 250 W/ 637 OVERRIDE(OP): Performed by: INTERNAL MEDICINE

## 2017-01-16 PROCEDURE — 99226 PR SUBSEQUENT OBSERVATION CARE,LEVEL III: CPT | Performed by: INTERNAL MEDICINE

## 2017-01-16 PROCEDURE — 82962 GLUCOSE BLOOD TEST: CPT | Mod: 91

## 2017-01-16 PROCEDURE — 87503 INFLUENZA DNA AMP PROB ADDL: CPT

## 2017-01-16 PROCEDURE — 700111 HCHG RX REV CODE 636 W/ 250 OVERRIDE (IP): Performed by: INTERNAL MEDICINE

## 2017-01-16 PROCEDURE — 80061 LIPID PANEL: CPT

## 2017-01-16 PROCEDURE — 85027 COMPLETE CBC AUTOMATED: CPT

## 2017-01-16 PROCEDURE — 82550 ASSAY OF CK (CPK): CPT

## 2017-01-16 PROCEDURE — 36415 COLL VENOUS BLD VENIPUNCTURE: CPT

## 2017-01-16 PROCEDURE — G0378 HOSPITAL OBSERVATION PER HR: HCPCS

## 2017-01-16 PROCEDURE — 83605 ASSAY OF LACTIC ACID: CPT

## 2017-01-16 PROCEDURE — G8978 MOBILITY CURRENT STATUS: HCPCS | Mod: CJ

## 2017-01-16 PROCEDURE — 51798 US URINE CAPACITY MEASURE: CPT

## 2017-01-16 PROCEDURE — G8987 SELF CARE CURRENT STATUS: HCPCS | Mod: CJ

## 2017-01-16 PROCEDURE — 97162 PT EVAL MOD COMPLEX 30 MIN: CPT

## 2017-01-16 RX ORDER — MORPHINE SULFATE 4 MG/ML
2 INJECTION, SOLUTION INTRAMUSCULAR; INTRAVENOUS EVERY 4 HOURS PRN
Status: DISCONTINUED | OUTPATIENT
Start: 2017-01-16 | End: 2017-01-18 | Stop reason: HOSPADM

## 2017-01-16 RX ORDER — POLYETHYLENE GLYCOL 3350 17 G/17G
1 POWDER, FOR SOLUTION ORAL DAILY
Status: DISCONTINUED | OUTPATIENT
Start: 2017-01-16 | End: 2017-01-18 | Stop reason: HOSPADM

## 2017-01-16 RX ORDER — GABAPENTIN 100 MG/1
100 CAPSULE ORAL 3 TIMES DAILY
Status: DISCONTINUED | OUTPATIENT
Start: 2017-01-16 | End: 2017-01-18 | Stop reason: HOSPADM

## 2017-01-16 RX ORDER — SODIUM CHLORIDE 9 MG/ML
1000 INJECTION, SOLUTION INTRAVENOUS ONCE
Status: COMPLETED | OUTPATIENT
Start: 2017-01-16 | End: 2017-01-16

## 2017-01-16 RX ORDER — LEVOTHYROXINE SODIUM 0.05 MG/1
50 TABLET ORAL
Status: DISCONTINUED | OUTPATIENT
Start: 2017-01-17 | End: 2017-01-18 | Stop reason: HOSPADM

## 2017-01-16 RX ORDER — MORPHINE SULFATE 15 MG/1
15 TABLET ORAL EVERY 6 HOURS PRN
Status: DISCONTINUED | OUTPATIENT
Start: 2017-01-16 | End: 2017-01-18 | Stop reason: HOSPADM

## 2017-01-16 RX ORDER — ACETAMINOPHEN 500 MG
1000 TABLET ORAL 2 TIMES DAILY
Status: DISCONTINUED | OUTPATIENT
Start: 2017-01-16 | End: 2017-01-18 | Stop reason: HOSPADM

## 2017-01-16 RX ORDER — BISACODYL 10 MG
10 SUPPOSITORY, RECTAL RECTAL DAILY
Status: DISCONTINUED | OUTPATIENT
Start: 2017-01-16 | End: 2017-01-18 | Stop reason: HOSPADM

## 2017-01-16 RX ORDER — CLOPIDOGREL BISULFATE 75 MG/1
75 TABLET ORAL DAILY
Status: DISCONTINUED | OUTPATIENT
Start: 2017-01-16 | End: 2017-01-18 | Stop reason: HOSPADM

## 2017-01-16 RX ORDER — AMOXICILLIN 250 MG
2 CAPSULE ORAL DAILY
Status: DISCONTINUED | OUTPATIENT
Start: 2017-01-16 | End: 2017-01-18 | Stop reason: HOSPADM

## 2017-01-16 RX ORDER — TAMSULOSIN HYDROCHLORIDE 0.4 MG/1
0.4 CAPSULE ORAL
Status: DISCONTINUED | OUTPATIENT
Start: 2017-01-16 | End: 2017-01-18 | Stop reason: HOSPADM

## 2017-01-16 RX ORDER — DEXTROSE MONOHYDRATE 25 G/50ML
25 INJECTION, SOLUTION INTRAVENOUS
Status: DISCONTINUED | OUTPATIENT
Start: 2017-01-16 | End: 2017-01-18 | Stop reason: HOSPADM

## 2017-01-16 RX ORDER — SIMVASTATIN 20 MG
20 TABLET ORAL
Status: DISCONTINUED | OUTPATIENT
Start: 2017-01-16 | End: 2017-01-18 | Stop reason: HOSPADM

## 2017-01-16 RX ORDER — CYCLOBENZAPRINE HCL 10 MG
5 TABLET ORAL
Status: DISCONTINUED | OUTPATIENT
Start: 2017-01-16 | End: 2017-01-18 | Stop reason: HOSPADM

## 2017-01-16 RX ADMIN — GABAPENTIN 100 MG: 100 CAPSULE ORAL at 16:29

## 2017-01-16 RX ADMIN — SODIUM CHLORIDE: 9 INJECTION, SOLUTION INTRAVENOUS at 23:49

## 2017-01-16 RX ADMIN — INSULIN LISPRO 1 UNITS: 100 INJECTION, SOLUTION INTRAVENOUS; SUBCUTANEOUS at 06:36

## 2017-01-16 RX ADMIN — DOCUSATE SODIUM 100 MG: 100 CAPSULE ORAL at 08:18

## 2017-01-16 RX ADMIN — TAMSULOSIN HYDROCHLORIDE 0.4 MG: 0.4 CAPSULE ORAL at 08:19

## 2017-01-16 RX ADMIN — OMEPRAZOLE 20 MG: 20 CAPSULE, DELAYED RELEASE ORAL at 08:18

## 2017-01-16 RX ADMIN — BISACODYL 10 MG: 10 SUPPOSITORY RECTAL at 10:50

## 2017-01-16 RX ADMIN — ACETAMINOPHEN 1000 MG: 500 TABLET, FILM COATED ORAL at 12:10

## 2017-01-16 RX ADMIN — SODIUM CHLORIDE: 9 INJECTION, SOLUTION INTRAVENOUS at 17:09

## 2017-01-16 RX ADMIN — ENOXAPARIN SODIUM 40 MG: 100 INJECTION SUBCUTANEOUS at 08:19

## 2017-01-16 RX ADMIN — STANDARDIZED SENNA CONCENTRATE AND DOCUSATE SODIUM 1 TABLET: 8.6; 5 TABLET, FILM COATED ORAL at 21:03

## 2017-01-16 RX ADMIN — SODIUM CHLORIDE 1000 ML: 9 INJECTION, SOLUTION INTRAVENOUS at 09:47

## 2017-01-16 RX ADMIN — ACETAMINOPHEN 1000 MG: 500 TABLET, FILM COATED ORAL at 21:03

## 2017-01-16 RX ADMIN — CLOPIDOGREL 75 MG: 75 TABLET, FILM COATED ORAL at 08:18

## 2017-01-16 RX ADMIN — SIMVASTATIN 20 MG: 20 TABLET, FILM COATED ORAL at 21:03

## 2017-01-16 RX ADMIN — GABAPENTIN 100 MG: 100 CAPSULE ORAL at 12:10

## 2017-01-16 RX ADMIN — GABAPENTIN 100 MG: 100 CAPSULE ORAL at 21:03

## 2017-01-16 RX ADMIN — METOPROLOL TARTRATE 12.5 MG: 25 TABLET, FILM COATED ORAL at 08:19

## 2017-01-16 RX ADMIN — STANDARDIZED SENNA CONCENTRATE AND DOCUSATE SODIUM 2 TABLET: 8.6; 5 TABLET, FILM COATED ORAL at 10:51

## 2017-01-16 RX ADMIN — POLYETHYLENE GLYCOL 3350 1 PACKET: 17 POWDER, FOR SOLUTION ORAL at 10:50

## 2017-01-16 RX ADMIN — MORPHINE SULFATE 2 MG: 4 INJECTION INTRAVENOUS at 13:32

## 2017-01-16 ASSESSMENT — ACTIVITIES OF DAILY LIVING (ADL): TOILETING: INDEPENDENT

## 2017-01-16 ASSESSMENT — PAIN SCALES - GENERAL
PAINLEVEL_OUTOF10: 3
PAINLEVEL_OUTOF10: 5
PAINLEVEL_OUTOF10: 1
PAINLEVEL_OUTOF10: 0
PAINLEVEL_OUTOF10: 9
PAINLEVEL_OUTOF10: 0

## 2017-01-16 ASSESSMENT — ENCOUNTER SYMPTOMS
NAUSEA: 0
HEADACHES: 0
COUGH: 0
DEPRESSION: 0
ABDOMINAL PAIN: 1
VOMITING: 0
BACK PAIN: 1
FEVER: 0
NECK PAIN: 0
CHILLS: 0
MYALGIAS: 1
BLOOD IN STOOL: 0
DIARRHEA: 0
SHORTNESS OF BREATH: 0
CONSTIPATION: 1
NERVOUS/ANXIOUS: 0
WEAKNESS: 1
STRIDOR: 0
SORE THROAT: 0
DIAPHORESIS: 0

## 2017-01-16 ASSESSMENT — LIFESTYLE VARIABLES: EVER_SMOKED: YES

## 2017-01-16 ASSESSMENT — GAIT ASSESSMENTS
DISTANCE (FEET): 125
GAIT LEVEL OF ASSIST: CONTACT GUARD ASSIST
DEVIATION: BRADYKINETIC;SHUFFLED GAIT
ASSISTIVE DEVICE: FRONT WHEEL WALKER

## 2017-01-16 NOTE — PROGRESS NOTES
"After giving pt his oral meds, he started gagging and spit up.  He denies nausea, just states \"this has been happening lately, I have a hard time keeping stuff down\".    "

## 2017-01-16 NOTE — PROGRESS NOTES
Pt AAOx4.  Denies any pain or discomfort at this time.  Denies NV.  Pt has not yet had a BM this morning.  Mepilex to sacrum in place, CDI.  L PIV patent, saline locked.  R PIV running fluids.  Bed alarm on.  POC discussed with pt.  All needs met at this time.  Bed in low position.  CAll light within reach.  Rounding in place.

## 2017-01-16 NOTE — PROGRESS NOTES
"Pt arrived on unit.  Very hard of hearing.  IV fluids running via right PIV.  Pt denies pain at this time.  Has dressing to right buttock/coxxyx area. Will put in wound consult.  Pt states he has a \"sore there\" and he has a home RN who changed the dressing this morning.   Treaded socks on, will medicate per MAR. Pt stated he fell last night at [facility], skin tear noted to right elbow, also small tear to right lateral ankle.    Pt told me he has neuropathy and uses a walker and scooter at home, upon telling him we'll get a walker for him to use here he said \"don't bother, I'm not going to walk here, I'm afraid I'll fall\" and told me the story of how he fell last night.  PT/OT order already placed.  All needs met at this time.   "

## 2017-01-16 NOTE — THERAPY
"Occupational Therapy Evaluation completed.   Functional Status:  Min A with ADLs and txfs  Plan of Care: Will benefit from Occupational Therapy 3 times per week  Discharge Recommendations:  Equipment: Will Continue to Assess for Equipment Needs. Post-acute therapy recommended before discharged home.    See \"Rehab Therapy-Acute\" Patient Summary Report for complete documentation.    "

## 2017-01-16 NOTE — ED NOTES
Patient transported to floor via gurney in stable condition accompanied by ED Tech. All belongings accounted for. [place] [id number] Charge RN notified of transfer.

## 2017-01-16 NOTE — H&P
PRIMARY CARE PHYSICIAN:  Not listed.    CHIEF COMPLAINT ON ADMISSION:  Abdominal pain x2 months.    HISTORY OF PRESENT ILLNESS:  Patient is a 89-year-old male who is very hard of   hearing, presents with complaints of chronic abdominal pain x2 months.    Patient reports the pain as diffuse.  Patient apparently had some kind of   workup and was told that he was blocked up.  Patient states that the abdominal   pain began as diarrhea, then the patient has been constipated.  In the   emergency room, patient was given an enema and did have a bowel movement.  CT   of the abdomen and pelvis revealed large amount of stool in the rectal vault.    On my evaluation, patient states that he feels better after having a bowel   movement.  The patient is diffusely tender to palpation.  Denies any radiation   of the pain.  The patient reports 1 episode of nausea and vomiting last   evening.  Otherwise, has no current complaints of nausea, no chest pain,   shortness of breath, fevers, chills, diarrhea or dysuria.    REVIEW OF SYSTEMS:  As per HPI.  All other systems are reviewed and negative.    PAST MEDICAL HISTORY:  Loss of hearing.    PAST SURGICAL HISTORY:  Not provided.    ALLERGIES:  CODEINE.    SOCIAL HISTORY:  The patient currently resides at an independent living   facility; does use a cane and occasionally a walker or his motorized   wheelchair for transport.  The patient had quit smoking for over 40-years.  He   drinks a cocktail rarely.  Denies any drug use.    CODE STATUS:  Full code.    FAMILY HISTORY:  Reviewed, is noncontributory to this presentation.    CURRENT HOME MEDICATIONS:  None.    PHYSICAL EXAMINATION:  VITAL SIGNS:  On admission, temperature is 36.6, pulse 72, respiration 18,   satting 95% on 2 liters nasal cannula, blood pressure is 115/52.  GENERAL:  Patient is alert and oriented x3 in no acute distress.  HEENT:  Normocephalic, atraumatic.  Mucous membranes are dry.  Extraocular   muscles intact.  NECK:   No JVD.  No thyromegaly.  CARDIOVASCULAR:  S1, S2 is regular.  No murmurs noted.  RESPIRATORY:  Decreased breath sounds bilateral lower lobes.  No rhonchi or   rales expiratory wheezes noted.  CHEST WALL:  Nontender to palpation.  BACK:  No flank tenderness to palpation.  ABDOMEN:  Bowel sounds are positive.  Soft, positive diffuse tenderness to   palpation with occasional spasms.  No masses noted.  EXTREMITIES:  No lower extremity edema.  Distal pulses palpable bilaterally.    No calf tenderness to palpation.  SKIN:  No ecchymosis or purpura.  PSYCHIATRIC:  Does not appear anxious or depressed.  NEUROLOGIC:  Cranial nerves grossly intact.  Moving all extremities   spontaneously.  Muscle strength was 5/5 bilateral upper extremity and lower   extremity.    LABORATORY DATA:  On admission, white count 9.6, hemoglobin 12.7, MCV of 90%,   platelet of 177, segs of 74%.  Sodium 134, potassium 4.5, anion gap of 9,   glucose 171, BUN 28, creatinine 1.20.  LFTs are within normal limits.  Lipase   of 44.  Lactic acid 2.1.  Troponins 0.02.  CT abdomen and pelvis reveals no   acute intra-abdominal findings, indeterminate right adrenal gland nodule,   likely lipid poor adenoma in the absence of known malignancy.  There is a   large amount of stool in the rectal vault, atherosclerosis, diverticulosis,   bilateral peribronchial thickening, _____ lower lobes consistent with chronic   infectious or inflammatory process.    ASSESSMENT AND PLAN:  The patient is a 89-year-old male who presents with   chronic abdominal pain with constipation.  1.  Abdominal pain secondary to constipation.  The patient will be admitted to   the medical observation unit.  Patient has been given an enema in the   emergency room status post with relief of constipation.  Patient does report   generalized weakness and is unable in regards to the assistance for transfer.    At this time will place patient on bowel protocol and will limit narcotics,   pain  control as needed.  2.  I would recommend increasing fiber in his diet.  3.  Generalized weakness with associated dehydration.  We will start patient   on IV fluid hydration.  We will follow up PT, OT evaluation.  The patient may   benefit from home health assistance when he is ready for discharge.  4.  Anemia of chronic disease.  5.  Mild hyponatremia.  6.  Very hard of hearing.  Patient does have hearing aids in place; however,   does not appear to be working.  7.  Gastrointestinal and deep venous thrombosis prophylaxis.  Patient is   tolerating oral diet and will be placed on Lovenox.  8.  Code status is full code.    Patient will be admitted to the medical observation unit.  We anticipate less   than 2 midnight stay.    Total admission time is 45 minutes.       ____________________________________     JONES BLACKMAN DO    EN / NTS    DD:  01/15/2017 20:18:27  DT:  01/15/2017 20:37:19    D#:  [id number]  Job#:  [id number]

## 2017-01-16 NOTE — DISCHARGE PLANNING
Dr. Olvera would like SW to contact [facility] to see if pt would qualify for in patient.  MARTIN spoke with RN Millicent who will review pt's chart to see if he qualifies for in patient status, she will contact Dr. Olvera if he does qualify.

## 2017-01-16 NOTE — ED NOTES
Met with pt at bedside.  Pt unable to recall the strengths of his medications.  Pt able to verify last doses taken.  [facility] p[facility].  No antibiotics noted in last 30 days.

## 2017-01-16 NOTE — ED NOTES
Patient assisted off of commode. Light brown watery stool noted - enema previously given. Patient currently resting comfortably in bed. Denies needs. Call bell within reach. Awaiting admit to floor.

## 2017-01-16 NOTE — THERAPY
"Physical Therapy Evaluation completed.   Bed Mobility:  Supine to Sit: Minimal Assist (HOB partially elevated)  Transfers: Sit to Stand: Minimal Assist  Gait: Level Of Assist: Contact Guard Assist with Front-Wheel Walker       Plan of Care: Will benefit from Physical Therapy 2 times per week and Plan to complete next treatment by Thursday 1/19  Discharge Recommendations: Equipment: Will Continue to Assess for Equipment Needs. Post-acute therapy recommended after discharged home.    See \"Rehab Therapy-Acute\" Patient Summary Report for complete documentation.     "

## 2017-01-16 NOTE — CARE PLAN
Problem: Safety  Goal: Will remain free from injury  Treaded socks in place. Bed alarm on. Call light within reach. Bed close to nursing station. All belongings and cords out of walk way. Appropriate signs placed. Frequent rounding. Bedside commode provided. Will continue to ensure pt is free of falls.     Problem: Bowel/Gastric:  Goal: Normal bowel function is maintained or improved  Bowel protocol in place. Will continue to limit narcotics. Pt passing gas this AM. Will continue to monitor for BM.

## 2017-01-16 NOTE — ED NOTES
Patient resting in bed. No acute distress. Denies pain, nausea, dizziness, lightheadedness. Fall prevention education completed with patient. Call bell within reach. Updated regarding plan of care - awaiting admit to floor.

## 2017-01-16 NOTE — PROGRESS NOTES
Daughter in law, Tricia would like to be phoned at [phone] for updates.  Tricia informed this RN that pt was previously in hospital 6 months ago for same issue and was given 4 enemas.  Will update MD.

## 2017-01-16 NOTE — CARE PLAN
Problem: Safety  Goal: Will remain free from falls  Intervention: Implement fall precautions  Fall precautions implemented. Pt remained safe and free from injury throughout shift.       Problem: Skin Integrity  Goal: Risk for impaired skin integrity will decrease  Intervention: Assess and monitor skin integrity, appearance and/or temperature  2 RN skin check completed upon pts arrival to unit, form filled out and in chart, wound consult ordered, photo taken and uploaded.

## 2017-01-16 NOTE — PROGRESS NOTES
Hospital Medicine Progress Note, Adult, Complex               Author: Ivette RASHID Olvera Date & Time created: 1/16/2017  10:14 AM     ID/CC: 89 M with hx of CAD s/p CABG, pacer in place,  DM2, HTN , [place] presented with c/p abdominal pain >2 mos and not able to take care of himself admitted with dehydration and constipation.    Interval History:  Patient is very [place] but alert and oriented, he c/o pain all over -chest wall, abdomen , legs  He is tender where ever I touch, even with light touch .  Trial of low dose gabapentin, d/cd ibuprofen given his renal insufficiency  Tylenol 1000 bid for pain.  Continue on bowel protocol.  Checking CPK, vit D, B12/folate with am labs  PT/OTeval for snf     Review of Systems:  Review of Systems   Constitutional: Positive for malaise/fatigue. Negative for fever, chills and diaphoresis.   HENT: Positive for hearing loss. Negative for congestion and sore throat.    Respiratory: Negative for cough, shortness of breath and stridor.    Cardiovascular: Negative for chest pain and leg swelling.   Gastrointestinal: Positive for abdominal pain and constipation. Negative for nausea, vomiting, diarrhea and blood in stool.   Genitourinary: Negative for dysuria and urgency.   Musculoskeletal: Positive for myalgias, back pain and joint pain. Negative for neck pain.   Neurological: Positive for weakness. Negative for headaches.   Psychiatric/Behavioral: Negative for depression. The patient is not nervous/anxious.        Physical Exam:  Physical Exam   Constitutional: He is oriented to person, place, and time. No distress.   HENT:   Head: Normocephalic and atraumatic.   Eyes: EOM are normal. Right eye exhibits no discharge. Left eye exhibits no discharge.   Neck: Neck supple. No JVD present.   Cardiovascular: Normal rate and regular rhythm.    No murmur heard.  Pacer in L chest wall    Pulmonary/Chest: Effort normal and breath sounds normal. No stridor. No respiratory distress. He has no wheezes. He has  no rales. He exhibits tenderness.   Mid line old CABG scar and pacer    Abdominal: Soft. Bowel sounds are normal. He exhibits no distension. There is tenderness. There is no rebound and no guarding.   But very touchy and tender    Musculoskeletal: He exhibits tenderness. He exhibits no edema.   Neurological: He is alert and oriented to person, place, and time.   Very [place]    Skin: Skin is warm and dry. He is not diaphoretic.   Psychiatric: He has a normal mood and affect. His behavior is normal.   Nursing note and vitals reviewed.      Labs:        Invalid input(s): [id number]  Recent Labs      01/15/17   1400   TROPONINI  0.02     Recent Labs      01/15/17   1400  17   0122   SODIUM  134*  134*   POTASSIUM  4.5  4.2   CHLORIDE  100  103   CO2  25  26   BUN  28*  21   CREATININE  1.20  1.12   CALCIUM  9.8  9.1     Recent Labs      01/15/17   1400  17   0122   ALTSGPT  12   --    ASTSGOT  19   --    ALKPHOSPHAT  59   --    TBILIRUBIN  1.2   --    LIPASE  44   --    GLUCOSE  171*  100*     Recent Labs      01/15/17   1400  17   0122   RBC  4.28*  4.13*   HEMOGLOBIN  12.7*  12.0*   HEMATOCRIT  38.8*  37.7*   PLATELETCT  177  158*     Recent Labs      01/15/17   1400  17   0122   WBC  9.6  9.2   NEUTSPOLYS  74.80*   --    LYMPHOCYTES  14.00*   --    MONOCYTES  9.00   --    EOSINOPHILS  1.50   --    BASOPHILS  0.30   --    ASTSGOT  19   --    ALTSGPT  12   --    ALKPHOSPHAT  59   --    TBILIRUBIN  1.2   --            Hemodynamics:  Temp (24hrs), Av.8 °C (98.3 °F), Min:36.2 °C (97.1 °F), Max:37.3 °C (99.2 °F)  Temperature: 37.3 °C (99.1 °F)  Pulse  Av.2  Min: 57  Max: 72Heart Rate (Monitored): 60  Blood Pressure : (!) 95/69 mmHg (Nurse notified.), NIBP: 129/62 mmHg     Respiratory:    Respiration: 17, Pulse Oximetry: 93 %        RUL Breath Sounds: Clear, RML Breath Sounds: Diminished, RLL Breath Sounds: Diminished, MARIO Breath Sounds: Clear, LLL Breath Sounds: Diminished  Fluids:  No  intake or output data in the 24 hours ending 01/16/17 1014  Weight: 78.019 kg (172 lb)  GI/Nutrition:  Orders Placed This Encounter   Procedures   • DIET ORDER     Standing Status: Standing      Number of Occurrences: 1      Standing Expiration Date:      Order Specific Question:  Diet:     Answer:  Diabetic [3]     Order Specific Question:  Texture/Fiber modifications:     Answer:  High Fiber [6]     Medical Decision Making, by Problem:  Active Hospital Problems    Diagnosis   • *Constipation [K59.00]  Received enema in ER and had a BM  On aggressive bowel protocol  Senna/docusate, miralax, and prn enema  Treating hypothyroidism      Hx of CAD s/p CABG  Resume plavix and statin  Metoprolol on hold because of low BP  May resume later once BP better    Pacer in place  Likely had hx of A Fib or heart block  [facility]     • Dehydration [E86.0]  On IVF., follow     • Type 2 diabetes mellitus (HCC) [E11.9]  HBA1C 7.7  On Lantus at home  Now On SSI, lantus on hold since NG lower side, follow BG and may restart on low dose lantus if high     • Dyspepsia [R10.13]On omeprazole     • CKD (chronic kidney disease) stage 3, GFR 30-59 ml/min [N18.3]  Avoid nephrotoxins  D/cd ibuprofen  He had CT abd w/contrast  Monitor renal function  Resume flomax for BPH, monitor UO, PVR     • Hyponatremia [E87.1]Likely hypovolumic  On IVF, follow     • Generalized weakness [R53.1]  Optimize nutritional status  Checking B12/folate, Vit D  PT/OT eval     • Hypothyroidism [E03.9]  Started on Levothyroxine 50 q am  Follow TSH in 3-4 weeks     • HTN (hypertension) [I10]Bp now low  Metoprolol on hold  On ivf       Dispo: Once medically stable with need SNF  PT/OT eval    Labs reviewed, Medications reviewed and Radiology images reviewed  Bonilla catheter: No Bonilla      DVT Prophylaxis: Enoxaparin (Lovenox)    Ulcer prophylaxis: Yes    Assessed for rehab: Patient was assess for and/or received rehabilitation services during this  hospitalization

## 2017-01-17 LAB
25(OH)D3 SERPL-MCNC: 26 NG/ML (ref 30–100)
ALBUMIN SERPL BCP-MCNC: 3.1 G/DL (ref 3.2–4.9)
ALBUMIN/GLOB SERPL: 1.3 G/DL
ALP SERPL-CCNC: 47 U/L (ref 30–99)
ALT SERPL-CCNC: 8 U/L (ref 2–50)
ANION GAP SERPL CALC-SCNC: 3 MMOL/L (ref 0–11.9)
AST SERPL-CCNC: 16 U/L (ref 12–45)
BASOPHILS # BLD AUTO: 0.7 % (ref 0–1.8)
BASOPHILS # BLD: 0.04 K/UL (ref 0–0.12)
BILIRUB SERPL-MCNC: 0.9 MG/DL (ref 0.1–1.5)
BUN SERPL-MCNC: 16 MG/DL (ref 8–22)
CALCIUM SERPL-MCNC: 8.1 MG/DL (ref 8.5–10.5)
CHLORIDE SERPL-SCNC: 110 MMOL/L (ref 96–112)
CO2 SERPL-SCNC: 21 MMOL/L (ref 20–33)
CORTIS SERPL-MCNC: 3.6 UG/DL (ref 0–23)
CREAT SERPL-MCNC: 1.13 MG/DL (ref 0.5–1.4)
EOSINOPHIL # BLD AUTO: 0.3 K/UL (ref 0–0.51)
EOSINOPHIL NFR BLD: 5.6 % (ref 0–6.9)
ERYTHROCYTE [DISTWIDTH] IN BLOOD BY AUTOMATED COUNT: 46.6 FL (ref 35.9–50)
FERRITIN SERPL-MCNC: 131 NG/ML (ref 22–322)
FOLATE SERPL-MCNC: 10.9 NG/ML
GFR SERPL CREATININE-BSD FRML MDRD: >60 ML/MIN/1.73 M 2
GLOBULIN SER CALC-MCNC: 2.3 G/DL (ref 1.9–3.5)
GLUCOSE BLD-MCNC: 112 MG/DL (ref 65–99)
GLUCOSE BLD-MCNC: 195 MG/DL (ref 65–99)
GLUCOSE BLD-MCNC: 79 MG/DL (ref 65–99)
GLUCOSE SERPL-MCNC: 87 MG/DL (ref 65–99)
HCT VFR BLD AUTO: 33.5 % (ref 42–52)
HGB BLD-MCNC: 10.7 G/DL (ref 14–18)
IMM GRANULOCYTES # BLD AUTO: 0.02 K/UL (ref 0–0.11)
IMM GRANULOCYTES NFR BLD AUTO: 0.4 % (ref 0–0.9)
IRON SATN MFR SERPL: 7 % (ref 15–55)
IRON SERPL-MCNC: 20 UG/DL (ref 50–180)
LYMPHOCYTES # BLD AUTO: 1.16 K/UL (ref 1–4.8)
LYMPHOCYTES NFR BLD: 21.6 % (ref 22–41)
MCH RBC QN AUTO: 29.3 PG (ref 27–33)
MCHC RBC AUTO-ENTMCNC: 31.9 G/DL (ref 33.7–35.3)
MCV RBC AUTO: 91.8 FL (ref 81.4–97.8)
MONOCYTES # BLD AUTO: 0.43 K/UL (ref 0–0.85)
MONOCYTES NFR BLD AUTO: 8 % (ref 0–13.4)
NEUTROPHILS # BLD AUTO: 3.41 K/UL (ref 1.82–7.42)
NEUTROPHILS NFR BLD: 63.7 % (ref 44–72)
NRBC # BLD AUTO: 0 K/UL
NRBC BLD AUTO-RTO: 0 /100 WBC
PLATELET # BLD AUTO: 127 K/UL (ref 164–446)
PMV BLD AUTO: 10.4 FL (ref 9–12.9)
POTASSIUM SERPL-SCNC: 4.2 MMOL/L (ref 3.6–5.5)
PROT SERPL-MCNC: 5.4 G/DL (ref 6–8.2)
RBC # BLD AUTO: 3.65 M/UL (ref 4.7–6.1)
SODIUM SERPL-SCNC: 134 MMOL/L (ref 135–145)
TIBC SERPL-MCNC: 284 UG/DL (ref 250–450)
VIT B12 SERPL-MCNC: 614 PG/ML (ref 211–911)
WBC # BLD AUTO: 5.4 K/UL (ref 4.8–10.8)

## 2017-01-17 PROCEDURE — 700112 HCHG RX REV CODE 229: Performed by: INTERNAL MEDICINE

## 2017-01-17 PROCEDURE — 85025 COMPLETE CBC W/AUTO DIFF WBC: CPT

## 2017-01-17 PROCEDURE — 700105 HCHG RX REV CODE 258: Performed by: INTERNAL MEDICINE

## 2017-01-17 PROCEDURE — 83550 IRON BINDING TEST: CPT

## 2017-01-17 PROCEDURE — 82306 VITAMIN D 25 HYDROXY: CPT

## 2017-01-17 PROCEDURE — 700102 HCHG RX REV CODE 250 W/ 637 OVERRIDE(OP): Performed by: INTERNAL MEDICINE

## 2017-01-17 PROCEDURE — 99225 PR SUBSEQUENT OBSERVATION CARE,LEVEL II: CPT | Performed by: HOSPITALIST

## 2017-01-17 PROCEDURE — A9270 NON-COVERED ITEM OR SERVICE: HCPCS | Performed by: INTERNAL MEDICINE

## 2017-01-17 PROCEDURE — 82533 TOTAL CORTISOL: CPT

## 2017-01-17 PROCEDURE — 700111 HCHG RX REV CODE 636 W/ 250 OVERRIDE (IP): Performed by: INTERNAL MEDICINE

## 2017-01-17 PROCEDURE — 36415 COLL VENOUS BLD VENIPUNCTURE: CPT

## 2017-01-17 PROCEDURE — 82607 VITAMIN B-12: CPT

## 2017-01-17 PROCEDURE — 80053 COMPREHEN METABOLIC PANEL: CPT

## 2017-01-17 PROCEDURE — 82962 GLUCOSE BLOOD TEST: CPT | Mod: 91

## 2017-01-17 PROCEDURE — 82746 ASSAY OF FOLIC ACID SERUM: CPT

## 2017-01-17 PROCEDURE — 83540 ASSAY OF IRON: CPT

## 2017-01-17 PROCEDURE — 82728 ASSAY OF FERRITIN: CPT

## 2017-01-17 PROCEDURE — G0378 HOSPITAL OBSERVATION PER HR: HCPCS

## 2017-01-17 RX ADMIN — OMEPRAZOLE 20 MG: 20 CAPSULE, DELAYED RELEASE ORAL at 08:32

## 2017-01-17 RX ADMIN — SIMVASTATIN 20 MG: 20 TABLET, FILM COATED ORAL at 22:10

## 2017-01-17 RX ADMIN — GABAPENTIN 100 MG: 100 CAPSULE ORAL at 14:00

## 2017-01-17 RX ADMIN — GABAPENTIN 100 MG: 100 CAPSULE ORAL at 08:31

## 2017-01-17 RX ADMIN — LEVOTHYROXINE SODIUM 50 MCG: 50 TABLET ORAL at 07:00

## 2017-01-17 RX ADMIN — GABAPENTIN 100 MG: 100 CAPSULE ORAL at 22:09

## 2017-01-17 RX ADMIN — ENOXAPARIN SODIUM 40 MG: 100 INJECTION SUBCUTANEOUS at 08:32

## 2017-01-17 RX ADMIN — ACETAMINOPHEN 1000 MG: 500 TABLET, FILM COATED ORAL at 08:31

## 2017-01-17 RX ADMIN — INSULIN LISPRO 1 UNITS: 100 INJECTION, SOLUTION INTRAVENOUS; SUBCUTANEOUS at 10:49

## 2017-01-17 RX ADMIN — TAMSULOSIN HYDROCHLORIDE 0.4 MG: 0.4 CAPSULE ORAL at 08:31

## 2017-01-17 RX ADMIN — STANDARDIZED SENNA CONCENTRATE AND DOCUSATE SODIUM 2 TABLET: 8.6; 5 TABLET, FILM COATED ORAL at 08:32

## 2017-01-17 RX ADMIN — DOCUSATE SODIUM 100 MG: 100 CAPSULE ORAL at 08:32

## 2017-01-17 RX ADMIN — POLYETHYLENE GLYCOL 3350 1 PACKET: 17 POWDER, FOR SOLUTION ORAL at 08:33

## 2017-01-17 RX ADMIN — STANDARDIZED SENNA CONCENTRATE AND DOCUSATE SODIUM 1 TABLET: 8.6; 5 TABLET, FILM COATED ORAL at 22:10

## 2017-01-17 RX ADMIN — ACETAMINOPHEN 1000 MG: 500 TABLET, FILM COATED ORAL at 22:09

## 2017-01-17 RX ADMIN — CLOPIDOGREL 75 MG: 75 TABLET, FILM COATED ORAL at 08:31

## 2017-01-17 RX ADMIN — SODIUM CHLORIDE: 9 INJECTION, SOLUTION INTRAVENOUS at 06:14

## 2017-01-17 ASSESSMENT — PAIN SCALES - GENERAL
PAINLEVEL_OUTOF10: 0
PAINLEVEL_OUTOF10: 0

## 2017-01-17 ASSESSMENT — ENCOUNTER SYMPTOMS
PHOTOPHOBIA: 0
FEVER: 0
SHORTNESS OF BREATH: 0
DIZZINESS: 0
MYALGIAS: 0
NAUSEA: 0
FOCAL WEAKNESS: 0
COUGH: 0
ABDOMINAL PAIN: 0
VOMITING: 0
TINGLING: 0
DIARRHEA: 0
WEAKNESS: 1
PALPITATIONS: 0
DEPRESSION: 0
CHILLS: 0
WHEEZING: 0
SORE THROAT: 0
HEADACHES: 0

## 2017-01-17 ASSESSMENT — LIFESTYLE VARIABLES: ALCOHOL_USE: NO

## 2017-01-17 NOTE — PROGRESS NOTES
Received alert and oriented x 4, v/s stable, due med given as ordered, ivf continued, call light within reach, bed alarm in placed, call appropriately, needs attended, will continue to monitor.

## 2017-01-17 NOTE — CARE PLAN
Problem: Safety  Goal: Will remain free from injury  Intervention: Provide assistance with mobility  Assisted with one person using the commode.      Problem: Discharge Barriers/Planning  Goal: Patient’s continuum of care needs will be met  Intervention: Involve patient and significant other/support system in setting and prioritizing goals for hospital stay and discharge  Possible home with home health assistance.

## 2017-01-17 NOTE — DISCHARGE PLANNING
Received choice form from Randee(MARTIN). Referral sent to [facility], [facility], [facility] and [place].

## 2017-01-17 NOTE — PROGRESS NOTES
Hospital Medicine Progress Note, Adult, Complex               Author: Lianet Patel Date & Time created: 1/17/2017  7:57 AM     CC: 89M resident at [facility] independent/assisted living facility with history of CAD s/p CABG, T2DM, and HTN presented with abdominal pain and admitted for dehydration and significant constipation    Interval History:  Patient with 4-5 bowel movements overnight and feels much better, his abdominal pain has resolved    Review of Systems:  Review of Systems   Constitutional: Negative for fever and chills.   HENT: Negative for congestion and sore throat.    Eyes: Negative for photophobia.   Respiratory: Negative for cough, shortness of breath and wheezing.    Cardiovascular: Negative for chest pain and palpitations.   Gastrointestinal: Negative for nausea, vomiting, abdominal pain and diarrhea.   Genitourinary: Negative for dysuria.   Musculoskeletal: Negative for myalgias.   Skin: Negative.    Neurological: Positive for weakness. Negative for dizziness, tingling, focal weakness and headaches.   Psychiatric/Behavioral: Negative for depression and suicidal ideas.       Physical Exam:  Physical Exam   Constitutional: He is oriented to person, place, and time. No distress.   Elderly, frail   HENT:   Head: Normocephalic and atraumatic.   Right Ear: External ear normal.   Left Ear: External ear normal.   Eyes: EOM are normal. Right eye exhibits no discharge. Left eye exhibits no discharge.   Neck: Neck supple. No JVD present.   Cardiovascular: Normal rate, regular rhythm and normal heart sounds.    Pulmonary/Chest: Effort normal and breath sounds normal. No respiratory distress. He exhibits no tenderness.   Abdominal: Soft. Bowel sounds are normal. He exhibits no distension. There is no tenderness.   Musculoskeletal: He exhibits no edema.   Neurological: He is alert and oriented to person, place, and time. No cranial nerve deficit.   Skin: Skin is dry. He is not diaphoretic. No erythema.    Psychiatric: He has a normal mood and affect. His behavior is normal.   Nursing note and vitals reviewed.      Labs:        Invalid input(s): [id number]  Recent Labs      01/15/17   1400  17   1031   CPKTOTAL   --   76   TROPONINI  0.02   --      Recent Labs      01/15/17   1400  17   0122  17   0113   SODIUM  134*  134*  134*   POTASSIUM  4.5  4.2  4.2   CHLORIDE  100  103  110   CO2  25  26  21   BUN  28*  21  16   CREATININE  1.20  1.12  1.13   CALCIUM  9.8  9.1  8.1*     Recent Labs      01/15/17   1400  01/16/17   0122  17   0113   ALTSGPT  12   --   8   ASTSGOT  19   --   16   ALKPHOSPHAT  59   --   47   TBILIRUBIN  1.2   --   0.9   LIPASE  44   --    --    GLUCOSE  171*  100*  87     Recent Labs      01/15/17   1400  01/16/17   0122  17   0113   RBC  4.28*  4.13*  3.65*   HEMOGLOBIN  12.7*  12.0*  10.7*   HEMATOCRIT  38.8*  37.7*  33.5*   PLATELETCT  177  158*  127*   IRON   --    --   20*   FERRITIN   --    --   131.0   TOTIRONBC   --    --   284     Recent Labs      01/15/17   1400  01/16/17   0122  17   0113   WBC  9.6  9.2  5.4   NEUTSPOLYS  74.80*   --   63.70   LYMPHOCYTES  14.00*   --   21.60*   MONOCYTES  9.00   --   8.00   EOSINOPHILS  1.50   --   5.60   BASOPHILS  0.30   --   0.70   ASTSGOT  19   --   16   ALTSGPT  12   --   8   ALKPHOSPHAT  59   --   47   TBILIRUBIN  1.2   --   0.9           Hemodynamics:  Temp (24hrs), Av.8 °C (98.2 °F), Min:36.1 °C (96.9 °F), Max:37.4 °C (99.4 °F)  Temperature: 36.7 °C (98 °F)  Pulse  Av.8  Min: 57  Max: 72   Blood Pressure : 129/59 mmHg     Respiratory:    Respiration: 16, Pulse Oximetry: 94 %        RUL Breath Sounds: Clear, RML Breath Sounds: Diminished, RLL Breath Sounds: Diminished, MARIO Breath Sounds: Clear, LLL Breath Sounds: Diminished  Fluids:    Intake/Output Summary (Last 24 hours) at 17 0757  Last data filed at 17 1700   Gross per 24 hour   Intake   2531 ml   Output      0 ml   Net   2531  ml        GI/Nutrition:  Orders Placed This Encounter   Procedures   • DIET ORDER     Standing Status: Standing      Number of Occurrences: 1      Standing Expiration Date:      Order Specific Question:  Diet:     Answer:  Diabetic [3]     Order Specific Question:  Texture/Fiber modifications:     Answer:  High Fiber [6]     Medical Decision Making, by Problem:  Active Hospital Problems    Diagnosis   • Constipation [K59.00]  - Resolved on aggressive bowel protocol  - Will need to have good bowel regimen recommendations for home     • Dehydration [E86.0]  - Resolved with IVF, stop NS and encourage PO     • Hx of CAD s/p CABG and PCR  - Continue plavix and statin  - Holding Metoprolol for hypotension, can restart if BP trends up     • Type 2 diabetes mellitus (HCC) [E11.9]  - Continue ISS and accuchecks  - Holding lantus for somewhat labile BS  - Possible restart at lower dose     • Dyspepsia [R10.13]  - Continue omeprazole     • CKD (chronic kidney disease) stage 3, GFR 30-59 ml/min [N18.3]  - Renal function remains stable, monitor  - Avoid nephrotoxins     • Hyponatremia [E87.1]  - Very mild, no clinical significance     • Generalized weakness [R53.1]  - Optimize nutritional status  - PT/OT eval  - CM/SW following to increase to assisted level of care at [facility]     • Hypothyroidism [E03.9]  -  Levothyroxine 50 q am  - Follow TSH in 3-4 weeks     • HTN (hypertension) [I10]  - Normotensive  - Holding home metoprolol for now     DISPO: Home today or tomorrow once higher level of care can be arranged at [place]     Labs reviewed and Medications reviewed  Bonilla catheter: No Bonilla      DVT Prophylaxis: Enoxaparin (Lovenox)    Ulcer prophylaxis: Yes    Assessed for rehab: Patient was assess for and/or received rehabilitation services during this hospitalization

## 2017-01-17 NOTE — PROGRESS NOTES
Received report from [facility] nurse. Assessment complete. Patient is A&Ox4,[place], hearing aids in place, denies pain and nausea, up with 1 assist to commode, shaky, no family present, PIV patent and IVF infusing.Patient is resting now. Call light within reach, bed in lowest position, treaded socks in place, and bed alarm on.

## 2017-01-17 NOTE — CARE PLAN
Problem: Venous Thromboembolism (VTW)/Deep Vein Thrombosis (DVT) Prevention:  Goal: Patient will participate in Venous Thrombosis (VTE)/Deep Vein Thrombosis (DVT)Prevention Measures  Intervention: Assess and monitor for anticoagulation complications  Lovenox administered per MAR.      Problem: Mobility  Goal: Risk for activity intolerance will decrease  Intervention: Encourage patient to increase activity level in collaboration with Interdisciplinary Team  Pt encouraged to sit up in chair and use commode, shaky with 1 assist.

## 2017-01-17 NOTE — DISCHARGE PLANNING
Care Transition Team Assessment  MARTIN met with pt at bedside.  Pt stated that he has been residing at the [facility] for the past 5-6 years.  He currently stays at the independent living side.  Pt reports that he needs additional support at night when he uses the restroom.  He goes to the restroom about 4-5 times during the night.  He is fearful of falling and hurting himself.  Pt has [facility] and is agreeable to go to SNF.  Pt stated that the majority of his family resides in [place] and has one son that lives locally, but does not want his involved in decision making.  Pt would like referrals sent to all [facility] facilities.  SW faxed choice to [facility].     SW contacted the [facility] and spoke to Carmen who stated that there is room to transition the pt to the assisted living side, however an assessment will need to be done on the pt to determine what level of care he will require.      MARTIN spoke to pt's dtr-in-law, Leslie #[phone], she is the pt's POA.  She resides in [place].  MARTIN went over dc plans.       Information Source  Who is responsible for making decisions for patient? : Patient  Source of Information: Patient  Primary Caregiver for Others?: No  Why do you believe you were admitted?: Fall    Elopement Risk  Legal Hold: No  Ambulatory or Self Mobile in Wheelchair: Yes  Disoriented: No  Psychiatric Symptoms: None  History of Wandering: No  Elopement this Admit: No  Vocalizing Wanting to Leave: No  Displays Behaviors, Body Language Wanting to Leave: No-Not at Risk for Elopement  Elopement Risk: Not at Risk for Elopement    Interdisciplinary Discharge Planning  Does Admitting Nurse Feel This Could be a Complex Discharge?: No  Lives with - Patient's Self Care Capacity: Adult Children, Spouse  Patient or legal guardian wants to designate a caregiver (see row info): No  Support Systems: Family Member(s)  Housing / Facility: 2 Story House  Name of Care Facility: [facility]  Do You Take your Prescribed Medications  Regularly: Yes  Mobility Issues: Yes  Prior Services: None  Patient Expects to be Discharged to:: [facility]  Assistance Needed: Yes  Durable Medical Equipment: Walker    Discharge Preparedness  [facility] resources needed?: None  Do you have concerns about your hospital stay or care after discharge?: No  Difficulity with ADLs: None  Difficulity with IADLs: None    Functional Assesment  Prior Functional Level: Uses Walker, Uses Wheelchair         Vision / Hearing Impairment  Vision Impairment : Yes  Right Eye Vision: Impaired, Wears Glasses  Left Eye Vision: Impaired, Wears Glasses  Hearing Impairment : Yes  Hearing Impairment: Both Ears, Hearing Device(s) Available    Values / Beliefs / Concerns  Values / Beliefs Concerns : No    Advance Directive  Advance Directive?: DPOA for Health Care  Durable Power of  Name and Contact : Leslie Bacon    Domestic Abuse  Have you ever been the victim of abuse or violence?: No  Physical Abuse or Sexual Abuse: No  Verbal Abuse or Emotional Abuse: No  Possible Abuse Reported to:: Not Applicable    Psychological Assessment  Suspect Abuse: No  Suspect Domestic Violence: No  Non-compliant with Treatment: No  Newly Diagnosed Catastrophic Illness: No  Needs Assistance Dealing with Catastrophic Illness: No  Facing Drastic Life Change: No  No Needs at this Time: No Needs at This Time    Discharge Risks or Barriers  Discharge risks or barriers?: No    Anticipated Discharge Information  Anticipated discharge disposition: Home, Assisted living

## 2017-01-18 ENCOUNTER — RESOLUTE PROFESSIONAL BILLING HOSPITAL PROF FEE (OUTPATIENT)
Dept: HOSPITALIST | Facility: SKILLED NURSING FACILITY | Age: 82
End: 2017-01-18
Payer: MEDICARE

## 2017-01-18 VITALS
BODY MASS INDEX: 24.08 KG/M2 | OXYGEN SATURATION: 97 % | RESPIRATION RATE: 16 BRPM | DIASTOLIC BLOOD PRESSURE: 64 MMHG | WEIGHT: 172 LBS | TEMPERATURE: 97.5 F | SYSTOLIC BLOOD PRESSURE: 123 MMHG | HEIGHT: 71 IN | HEART RATE: 54 BPM

## 2017-01-18 LAB
GLUCOSE BLD-MCNC: 102 MG/DL (ref 65–99)
GLUCOSE BLD-MCNC: 141 MG/DL (ref 65–99)
GLUCOSE BLD-MCNC: 171 MG/DL (ref 65–99)

## 2017-01-18 PROCEDURE — A9270 NON-COVERED ITEM OR SERVICE: HCPCS | Performed by: INTERNAL MEDICINE

## 2017-01-18 PROCEDURE — 97535 SELF CARE MNGMENT TRAINING: CPT

## 2017-01-18 PROCEDURE — 700102 HCHG RX REV CODE 250 W/ 637 OVERRIDE(OP): Performed by: INTERNAL MEDICINE

## 2017-01-18 PROCEDURE — 700112 HCHG RX REV CODE 229: Performed by: INTERNAL MEDICINE

## 2017-01-18 PROCEDURE — G0378 HOSPITAL OBSERVATION PER HR: HCPCS

## 2017-01-18 PROCEDURE — 82962 GLUCOSE BLOOD TEST: CPT

## 2017-01-18 PROCEDURE — 99217 PR OBSERVATION CARE DISCHARGE: CPT | Performed by: HOSPITALIST

## 2017-01-18 PROCEDURE — 700111 HCHG RX REV CODE 636 W/ 250 OVERRIDE (IP): Performed by: INTERNAL MEDICINE

## 2017-01-18 PROCEDURE — 99306 1ST NF CARE HIGH MDM 50: CPT | Mod: AI | Performed by: FAMILY MEDICINE

## 2017-01-18 RX ORDER — LACTULOSE 20 G/30ML
30 SOLUTION ORAL
Qty: 30 EACH | Status: SHIPPED | DISCHARGE
Start: 2017-01-18

## 2017-01-18 RX ORDER — LEVOTHYROXINE SODIUM 0.05 MG/1
50 TABLET ORAL
Qty: 30 TAB | Status: SHIPPED | DISCHARGE
Start: 2017-01-18

## 2017-01-18 RX ORDER — POLYETHYLENE GLYCOL 3350 17 G/17G
17 POWDER, FOR SOLUTION ORAL DAILY
Refills: 3 | Status: SHIPPED | DISCHARGE
Start: 2017-01-18

## 2017-01-18 RX ORDER — INSULIN GLARGINE 100 [IU]/ML
5 INJECTION, SOLUTION SUBCUTANEOUS
Qty: 10 ML | Status: SHIPPED | DISCHARGE
Start: 2017-01-18

## 2017-01-18 RX ORDER — BISACODYL 10 MG
10 SUPPOSITORY, RECTAL RECTAL
Refills: 0 | Status: SHIPPED | DISCHARGE
Start: 2017-01-18

## 2017-01-18 RX ORDER — ACETAMINOPHEN 325 MG/1
650 TABLET ORAL EVERY 6 HOURS PRN
Qty: 30 TAB | Refills: 0 | Status: SHIPPED | DISCHARGE
Start: 2017-01-18

## 2017-01-18 RX ORDER — PSEUDOEPHEDRINE HCL 30 MG
100 TABLET ORAL EVERY MORNING
Qty: 60 CAP | Status: SHIPPED | DISCHARGE
Start: 2017-01-18

## 2017-01-18 RX ADMIN — STANDARDIZED SENNA CONCENTRATE AND DOCUSATE SODIUM 2 TABLET: 8.6; 5 TABLET, FILM COATED ORAL at 08:29

## 2017-01-18 RX ADMIN — ENOXAPARIN SODIUM 40 MG: 100 INJECTION SUBCUTANEOUS at 08:38

## 2017-01-18 RX ADMIN — TAMSULOSIN HYDROCHLORIDE 0.4 MG: 0.4 CAPSULE ORAL at 08:29

## 2017-01-18 RX ADMIN — LEVOTHYROXINE SODIUM 50 MCG: 50 TABLET ORAL at 05:50

## 2017-01-18 RX ADMIN — POLYETHYLENE GLYCOL 3350 1 PACKET: 17 POWDER, FOR SOLUTION ORAL at 08:37

## 2017-01-18 RX ADMIN — GABAPENTIN 100 MG: 100 CAPSULE ORAL at 08:29

## 2017-01-18 RX ADMIN — DOCUSATE SODIUM 100 MG: 100 CAPSULE ORAL at 08:30

## 2017-01-18 RX ADMIN — CLOPIDOGREL 75 MG: 75 TABLET, FILM COATED ORAL at 08:30

## 2017-01-18 RX ADMIN — GABAPENTIN 100 MG: 100 CAPSULE ORAL at 15:25

## 2017-01-18 RX ADMIN — OMEPRAZOLE 20 MG: 20 CAPSULE, DELAYED RELEASE ORAL at 08:29

## 2017-01-18 RX ADMIN — ACETAMINOPHEN 1000 MG: 500 TABLET, FILM COATED ORAL at 08:30

## 2017-01-18 ASSESSMENT — PAIN SCALES - GENERAL
PAINLEVEL_OUTOF10: 0

## 2017-01-18 NOTE — DISCHARGE PLANNING
Transport arranged with Ariana. Patient will be leaving today @1600 [facility] SARA Jeff.

## 2017-01-18 NOTE — PROGRESS NOTES
Received report, assumed pt care. Pt a&o x 4, VSS, Assessment completed. Resting comfortably in bed with call light, bedside table in reach. No c/o at this time. Side rails up x 2. Instructed to use call light when needing to get OOB verbalized understanding. Bed alarm on, bed in low position. Will continue to monitor.

## 2017-01-18 NOTE — CARE PLAN
Problem: Safety  Goal: Will remain free from falls  Intervention: Assess risk factors for falls    01/17/17 2219 01/18/17 0230   OTHER   Fall Risk High Risk to Fall - 2 or more points  --    Risk for Injury-Any positive answers results in the pt being at high risk for fall related injury Age: Over 85 Years --    Mobility Status Assessment 1-1 Healthcare Provider Required for Assistance with Ambulation & Transfer --    History of fall 2 --    Date of Last Fall 01/14/17 --    Pt Calls for Assistance --  Yes           Problem: Psychosocial needs  Goal: Anxiety reduction  Intervention: Stimuli reduction, calming techniques  Lights turned low, noise minimized to relieve pt anxiety and promote rest. Pt able to sleep comfortably.

## 2017-01-18 NOTE — PROGRESS NOTES
Received report from night RN, assumed care at 0700. Pt A&OX4, able to specify needs. Pt with hearing aids in place in both ears. Pt with bed frame alarm on, educated to call when needing assistance, pt verbalized understanding. Bed in lowest and locked position, non-skid socks in place. Call light in reach, hourly rounding in place. Will continue to monitor.

## 2017-01-18 NOTE — DISCHARGE SUMMARY
CHIEF COMPLAINT:  Abdominal pain.    DISCHARGE DIAGNOSES:  1.  Constipation, resolved.  2.  Dehydration, resolved.  3.  History of type 2 diabetes mellitus.  4.  History of coronary artery disease.  5.  History of dyspepsia.  6.  History of chronic kidney disease, at baseline.  7.  History of generalized weakness.  8.  History of hypothyroidism.  9.  History of hypertension.    STUDIES:  CT of the abdomen and pelvis:  No acute intra-abdominal findings of   indeterminate left adrenal gland nodule likely lipid poor adenoma in the   absence of a known-malignancy, large amount of stool in the rectal wall,   atherosclerosis, diverticulosis, bilateral peribronchial thickening, and mucus   plugging in the lower lobes consistent with chronic infectious or   inflammatory process.    HOSPITAL COURSE:  For complete history and physical, please see notes dictated   by Dr. Katerina Bradley.  In short, this is a very pleasant 89-year-old gentleman   who has multiple medical issues and is currently a resident at an independent   living facility here in [place], [place], who comes in with complaints of   abdominal pain.  Upon assessment, a CT of the abdomen was obtained to assess   for the underlying etiology.  No significant injury was noted; however, he was   found to have constipation.  Additionally, he had laboratory findings then   clinical findings consistent with dehydration.  The patient was admitted to   the hospital and started on an aggressive bowel regimen.  By the second day of   his admission, he had had multiple bowel movements and was feeling much   improved.  All has complaints of pain have resolved.  The dehydration has been   corrected with IV fluids.  His diet has been advanced and he is tolerating a   regular diet without any discomfort.  The patient is quite elderly and   debilitated and he has requested that we contact his living facility to   increase his level of care from independent living to assisted-living.   This   has been done.  He has also been seen by physical therapy during this   hospitalization.  He would benefit from a brief stay in a skilled nursing   facility for daily physical therapy prior to discharge home to the assisted   living facility.  Social work has been followed during this hospitalization   and they have made arrangements for the patient to be transferred to a skilled   nursing facility today.    DISPOSITION:  Skilled nursing.    DIET:  Diabetic.    ACTIVITIES:  As tolerated.    MEDICATIONS:  1.  Over-the-counter Tylenol as needed.  2.  Dulcolax 10 mg suppository q. 24 hours p.r.n. constipation.  3.  Docusate 100 mg p.o. daily.  4.  Lactulose 30 mL p.o. q 24 if senna docusate has been ineffective.  5.  Synthroid 50 mcg p.o. daily.  6.  MiraLax 17 g p.o. daily.  7.  Lantus 35 units subcutaneously at bedtime.  8.  Plavix 75 mg p.o. daily.  9.  Metoprolol 12.5 mg p.o. b.i.d.  10.  Zocor 20 mg p.o. at bedtime.  11.  Flomax 0.4 mg p.o. daily.    FOLLOWUP:  The patient will need to follow up with his primary care provider   after his discharge from the skilled nursing facility.    These discharge instructions were discussed with the patient at bedside, he   has expressed understanding and agreed to comply with all discharge   instructions.    Thirty-six minutes were spent in the discharge planning and management of this   patient, including more than 50% of the time spent face-to-face in   counseling.       ____________________________________     MD LOGAN TORRES / LINDA    DD:  01/18/2017 12:39:09  DT:  01/18/2017 13:17:53    D#:  [id number]  Job#:  [id number]

## 2017-01-18 NOTE — CARE PLAN
Problem: Knowledge Deficit  Goal: Knowledge of disease process/condition, treatment plan, diagnostic tests, and medications will improve  Intervention: Assess knowledge level of disease process/condition, treatment plan, diagnostic tests, and medications  Patient educated about medications and potential side effects.      Problem: Skin Integrity  Goal: Risk for impaired skin integrity will decrease  Intervention: Assess risk factors for impaired skin integrity and/or pressure ulcers  Patient encouraged to change positions in bed often to prevent skin breakdown, pt demonstrated action.

## 2017-01-18 NOTE — THERAPY
"Occupational Therapy Treatment completed with focus on ADLs, ADL transfers and patient educationpatient education.  Functional Status:  Pt seen for OT tx today.  Pt continues to be limited by decreased strength and dizziness.  Pt completed supine to sit with CGA, sit to stand with CGA, ambulation from bed to sink with CGA.  Refused to stand to complete gr/hy so did it seated as stated he was dizzy.  O2 sats on room air during activity were 96%.  Pt needed min A to complete LB dressing as he wasn't able to let go with B UE and pull pants up but was able to get pants around his feet.  Noticed throughout tx pt voice was girgly at times but pt stated this is has been since his stroke.  Pt would benefit from continued inpt OT to increase independence with functional endurance, functional transfers, and ADLs.  Plan of Care: Will benefit from Occupational Therapy 3 times per week  Discharge Recommendations:  Equipment Will Continue to Assess for Equipment Needs. Post-acute therapy recommended before discharged home.    See \"Rehab Therapy-Acute\" Patient Summary Report for complete documentation.   "

## 2017-01-18 NOTE — DISCHARGE INSTRUCTIONS
Return immediately to the Emergency Department if you experience continuing or worsening discomfort in your abdomen, fever, chest pain, difficulty breathing, back pain, or any other new or worsening symptoms.       Constipation, Adult  Constipation is when a person has fewer than three bowel movements a week, has difficulty having a bowel movement, or has stools that are dry, hard, or larger than normal. As people grow older, constipation is more common. A low-fiber diet, not taking in enough fluids, and taking certain medicines may make constipation worse.   CAUSES   · Certain medicines, such as antidepressants, pain medicine, iron supplements, antacids, and water pills.    · Certain diseases, such as diabetes, irritable bowel syndrome (IBS), thyroid disease, or depression.    · Not drinking enough water.    · Not eating enough fiber-rich foods.    · Stress or travel.    · Lack of physical activity or exercise.    · Ignoring the urge to have a bowel movement.    · Using laxatives too much.    SIGNS AND SYMPTOMS   1. Having fewer than three bowel movements a week.    2. Straining to have a bowel movement.    3. Having stools that are hard, dry, or larger than normal.    4. Feeling full or bloated.    5. Pain in the lower abdomen.    6. Not feeling relief after having a bowel movement.    DIAGNOSIS   Your health care provider will take a medical history and perform a physical exam. Further testing may be done for severe constipation. Some tests may include:  1. A barium enema X-ray to examine your rectum, colon, and, sometimes, your small intestine.    2. A sigmoidoscopy to examine your lower colon.    3. A colonoscopy to examine your entire colon.  TREATMENT   Treatment will depend on the severity of your constipation and what is causing it. Some dietary treatments include drinking more fluids and eating more fiber-rich foods. Lifestyle treatments may include regular exercise. If these diet and lifestyle  recommendations do not help, your health care provider may recommend taking over-the-counter laxative medicines to help you have bowel movements. Prescription medicines may be prescribed if over-the-counter medicines do not work.   HOME CARE INSTRUCTIONS   1. Eat foods that have a lot of fiber, such as fruits, vegetables, whole grains, and beans.  2. Limit foods high in fat and processed sugars, such as french fries, hamburgers, cookies, candies, and soda.    3. A fiber supplement may be added to your diet if you cannot get enough fiber from foods.    4. Drink enough fluids to keep your urine clear or pale yellow.    5. Exercise regularly or as directed by your health care provider.    6. Go to the restroom when you have the urge to go. Do not hold it.    7. Only take over-the-counter or prescription medicines as directed by your health care provider. Do not take other medicines for constipation without talking to your health care provider first.    SEEK IMMEDIATE MEDICAL CARE IF:   1. You have bright red blood in your stool.    2. Your constipation lasts for more than 4 days or gets worse.    3. You have abdominal or rectal pain.    4. You have thin, pencil-like stools.    5. You have unexplained weight loss.  MAKE SURE YOU:   · Understand these instructions.  · Will watch your condition.  · Will get help right away if you are not doing well or get worse.     This information is not intended to replace advice given to you by your health care provider. Make sure you discuss any questions you have with your health care provider.     Document Released: 09/15/2005 Document Revised: 01/08/2016 Document Reviewed: 09/29/2014  [employer] Patient Education ©2016 [employer]  Discharge Instructions    Discharged to other by medical transportation with escort. Discharged via wheelchair, hospital escort: Yes.  Special equipment needed: Not Applicable    Be sure to schedule a follow-up appointment with your primary  care doctor or any specialists as instructed.     Discharge Plan:   Influenza Vaccine Indication: Not indicated: Previously immunized this influenza season and > 8 years of age (recieved 11/01/2016)    I understand that a diet low in cholesterol, fat, and sodium is recommended for good health. Unless I have been given specific instructions below for another diet, I accept this instruction as my diet prescription.   Other diet: Diabetic, high fiber    Special Instructions: None    · Is patient discharged on Warfarin / Coumadin?   No     · Is patient Post Blood Transfusion?  No    Depression / Suicide Risk    As you are discharged from this [facility] facility, it is important to learn how to keep safe from harming yourself.    Recognize the warning signs:  · Abrupt changes in personality, positive or negative- including increase in energy   · Giving away possessions  · Change in eating patterns- significant weight changes-  positive or negative  · Change in sleeping patterns- unable to sleep or sleeping all the time   · Unwillingness or inability to communicate  · Depression  · Unusual sadness, discouragement and loneliness  · Talk of wanting to die  · Neglect of personal appearance   · Rebelliousness- reckless behavior  · Withdrawal from people/activities they love  · Confusion- inability to concentrate     If you or a loved one observes any of these behaviors or has concerns about self-harm, here's what you can do:  · Talk about it- your feelings and reasons for harming yourself  · Remove any means that you might use to hurt yourself (examples: pills, rope, extension cords, firearm)  · Get professional help from the community (Mental Health, Substance Abuse, psychological counseling)  · Do not be alone:Call your Safe Contact- someone whom you trust who will be there for you.  · Call your local CRISIS HOTLINE 015-7756 or 223-736-4231  · Call your local Children's Mobile Crisis Response Team [facility] (533)  465-9492 or www.[employer]  · Call the toll free National Suicide Prevention Hotlines   · National Suicide Prevention Lifeline 704-987-AGDJ (9816)  · National Rewardable Line Network 800-SUICIDE (450-6539)

## 2017-01-18 NOTE — DISCHARGE PLANNING
MARTIN met with pt at bedside to go over which facilities have accepted the pt and where he would like to go.  Pt stated that he would like to be closest to the [facility], SW informed him that the closest facility is [facility].  MARTIN sent the updated choice form to [facility].    MARTIN spoke to pt's dtr-in-law, Rocael, and informed her that the pt will be transferring to a SNF.  MARTIN also contacted Jonnie at the [facility] to inform him of pt's dc plan.  Jonnie stated that the facility is able to do the assessment on the pt for the assisted living side.

## 2017-01-18 NOTE — DISCHARGE PLANNING
SW requested transport time to be moved up.  PARVEEN Weber checked with [facility] who stated that they are unable to take the pt until 5:30pm.  Patsy Guardado cannot  pt until 4pm.

## 2017-01-18 NOTE — DISCHARGE PLANNING
Patient has been accepted by [facility]. [facility] sent a declined fax.

## 2017-01-20 PROCEDURE — 99309 SBSQ NF CARE MODERATE MDM 30: CPT | Performed by: FAMILY MEDICINE

## 2017-01-25 PROCEDURE — 99308 SBSQ NF CARE LOW MDM 20: CPT | Performed by: FAMILY MEDICINE

## 2017-02-03 PROCEDURE — 99309 SBSQ NF CARE MODERATE MDM 30: CPT | Performed by: FAMILY MEDICINE

## 2017-02-06 PROCEDURE — 99316 NF DSCHRG MGMT 30 MIN+: CPT | Performed by: FAMILY MEDICINE

## 2017-02-17 ENCOUNTER — HOME HEALTH ADMISSION (OUTPATIENT)
Dept: HOME HEALTH SERVICES | Facility: HOME HEALTHCARE | Age: 82
End: 2017-02-17
Payer: MEDICARE

## 2017-02-18 ENCOUNTER — HOME CARE VISIT (OUTPATIENT)
Dept: HOME HEALTH SERVICES | Facility: HOME HEALTHCARE | Age: 82
End: 2017-02-18
Payer: MEDICARE

## 2017-02-18 PROCEDURE — G0162 HHC RN E&M PLAN SVS, 15 MIN: HCPCS

## 2017-02-18 PROCEDURE — 665001 SOC-HOME HEALTH

## 2017-02-19 ENCOUNTER — HOME CARE VISIT (OUTPATIENT)
Dept: HOME HEALTH SERVICES | Facility: HOME HEALTHCARE | Age: 82
End: 2017-02-19
Payer: MEDICARE

## 2017-02-19 VITALS
SYSTOLIC BLOOD PRESSURE: 128 MMHG | RESPIRATION RATE: 22 BRPM | HEIGHT: 71 IN | HEART RATE: 60 BPM | TEMPERATURE: 97.4 F | DIASTOLIC BLOOD PRESSURE: 80 MMHG

## 2017-02-19 SDOH — ECONOMIC STABILITY: HOUSING INSECURITY: UNSAFE COOKING RANGE AREA: 0

## 2017-02-19 SDOH — ECONOMIC STABILITY: HOUSING INSECURITY: UNSAFE APPLIANCES: 0

## 2017-02-19 SDOH — ECONOMIC STABILITY: HOUSING INSECURITY: HOME SAFETY: PT LIVES IN INDEPENDANT LIVING FACILITY, HAS PAY-FOR AIDE SERVICE DAILY (HOMEAWAY)

## 2017-02-19 ASSESSMENT — ACTIVITIES OF DAILY LIVING (ADL)
HOME_HEALTH_OASIS: 03
HOME_HEALTH_OASIS: 01
OASIS_M1830: 03

## 2017-02-19 ASSESSMENT — PATIENT HEALTH QUESTIONNAIRE - PHQ9
1. LITTLE INTEREST OR PLEASURE IN DOING THINGS: 00
2. FEELING DOWN, DEPRESSED, IRRITABLE, OR HOPELESS: 00

## 2017-02-19 ASSESSMENT — ENCOUNTER SYMPTOMS
NAUSEA: DENIES
VOMITING: DENIES
SHORTNESS OF BREATH: T

## 2017-02-21 ENCOUNTER — HOME CARE VISIT (OUTPATIENT)
Dept: HOME HEALTH SERVICES | Facility: HOME HEALTHCARE | Age: 82
End: 2017-02-21
Payer: MEDICARE

## 2017-02-24 ENCOUNTER — HOME CARE VISIT (OUTPATIENT)
Dept: HOME HEALTH SERVICES | Facility: HOME HEALTHCARE | Age: 82
End: 2017-02-24
Payer: MEDICARE

## 2017-02-24 VITALS — SYSTOLIC BLOOD PRESSURE: 116 MMHG | DIASTOLIC BLOOD PRESSURE: 68 MMHG | TEMPERATURE: 98.4 F | RESPIRATION RATE: 21 BRPM

## 2017-02-24 PROCEDURE — G0299 HHS/HOSPICE OF RN EA 15 MIN: HCPCS

## 2017-02-27 ENCOUNTER — HOME CARE VISIT (OUTPATIENT)
Dept: HOME HEALTH SERVICES | Facility: HOME HEALTHCARE | Age: 82
End: 2017-02-27
Payer: MEDICARE

## 2017-02-27 PROCEDURE — G0299 HHS/HOSPICE OF RN EA 15 MIN: HCPCS

## 2017-02-27 SDOH — ECONOMIC STABILITY: HOUSING INSECURITY: UNSAFE COOKING RANGE AREA: 0

## 2017-02-27 SDOH — ECONOMIC STABILITY: HOUSING INSECURITY: UNSAFE APPLIANCES: 0

## 2017-02-27 ASSESSMENT — ENCOUNTER SYMPTOMS
VOMITING: DENIES ANY
NAUSEA: DENIES ANY

## 2017-02-28 VITALS — TEMPERATURE: 98.5 F | RESPIRATION RATE: 16 BRPM

## 2017-02-28 SDOH — ECONOMIC STABILITY: HOUSING INSECURITY: UNSAFE APPLIANCES: 0

## 2017-02-28 SDOH — ECONOMIC STABILITY: HOUSING INSECURITY: UNSAFE COOKING RANGE AREA: 0

## 2017-02-28 ASSESSMENT — ENCOUNTER SYMPTOMS
VOMITING: DENIES ANY
NAUSEA: DENIES ANY

## 2017-03-02 ENCOUNTER — HOME CARE VISIT (OUTPATIENT)
Dept: HOME HEALTH SERVICES | Facility: HOME HEALTHCARE | Age: 82
End: 2017-03-02
Payer: MEDICARE

## 2017-03-02 VITALS — RESPIRATION RATE: 14 BRPM | TEMPERATURE: 98.3 F

## 2017-03-02 PROCEDURE — G0299 HHS/HOSPICE OF RN EA 15 MIN: HCPCS

## 2017-03-02 SDOH — ECONOMIC STABILITY: HOUSING INSECURITY: UNSAFE COOKING RANGE AREA: 0

## 2017-03-02 SDOH — ECONOMIC STABILITY: HOUSING INSECURITY: UNSAFE APPLIANCES: 0

## 2017-03-02 ASSESSMENT — ENCOUNTER SYMPTOMS
NAUSEA: DENIES ANY
VOMITING: DENIES ANY

## 2017-03-06 ENCOUNTER — HOME CARE VISIT (OUTPATIENT)
Dept: HOME HEALTH SERVICES | Facility: HOME HEALTHCARE | Age: 82
End: 2017-03-06
Payer: MEDICARE

## 2017-03-06 VITALS — TEMPERATURE: 98.2 F | RESPIRATION RATE: 15 BRPM

## 2017-03-06 PROCEDURE — G0299 HHS/HOSPICE OF RN EA 15 MIN: HCPCS

## 2017-03-07 SDOH — ECONOMIC STABILITY: HOUSING INSECURITY: UNSAFE APPLIANCES: 0

## 2017-03-07 SDOH — ECONOMIC STABILITY: HOUSING INSECURITY: UNSAFE COOKING RANGE AREA: 0

## 2017-03-07 ASSESSMENT — ENCOUNTER SYMPTOMS
NAUSEA: DENIES ANY
VOMITING: DENIES ANY

## 2017-03-13 ENCOUNTER — HOME CARE VISIT (OUTPATIENT)
Dept: HOME HEALTH SERVICES | Facility: HOME HEALTHCARE | Age: 82
End: 2017-03-13
Payer: MEDICARE

## 2017-03-13 VITALS — RESPIRATION RATE: 15 BRPM | TEMPERATURE: 98.5 F | DIASTOLIC BLOOD PRESSURE: 57 MMHG | SYSTOLIC BLOOD PRESSURE: 109 MMHG

## 2017-03-13 PROCEDURE — G0299 HHS/HOSPICE OF RN EA 15 MIN: HCPCS

## 2017-03-13 SDOH — ECONOMIC STABILITY: HOUSING INSECURITY: UNSAFE COOKING RANGE AREA: 0

## 2017-03-13 SDOH — ECONOMIC STABILITY: HOUSING INSECURITY: UNSAFE APPLIANCES: 0

## 2017-03-13 ASSESSMENT — ENCOUNTER SYMPTOMS
NAUSEA: DENIES ANY
VOMITING: DENIES ANY

## 2017-03-20 ENCOUNTER — HOME CARE VISIT (OUTPATIENT)
Dept: HOME HEALTH SERVICES | Facility: HOME HEALTHCARE | Age: 82
End: 2017-03-20
Payer: MEDICARE

## 2017-03-20 PROCEDURE — G0299 HHS/HOSPICE OF RN EA 15 MIN: HCPCS

## 2017-03-21 VITALS — RESPIRATION RATE: 16 BRPM | TEMPERATURE: 98.8 F

## 2017-03-21 SDOH — ECONOMIC STABILITY: HOUSING INSECURITY: UNSAFE COOKING RANGE AREA: 0

## 2017-03-21 SDOH — ECONOMIC STABILITY: HOUSING INSECURITY: UNSAFE APPLIANCES: 0

## 2017-03-21 ASSESSMENT — ENCOUNTER SYMPTOMS
NAUSEA: DENIES ANY
VOMITING: DENIES ANY

## 2017-03-27 ENCOUNTER — HOME CARE VISIT (OUTPATIENT)
Dept: HOME HEALTH SERVICES | Facility: HOME HEALTHCARE | Age: 82
End: 2017-03-27
Payer: MEDICARE

## 2017-03-27 VITALS — TEMPERATURE: 98.1 F | SYSTOLIC BLOOD PRESSURE: 103 MMHG | RESPIRATION RATE: 15 BRPM | DIASTOLIC BLOOD PRESSURE: 59 MMHG

## 2017-03-27 PROCEDURE — G0299 HHS/HOSPICE OF RN EA 15 MIN: HCPCS

## 2017-03-27 SDOH — ECONOMIC STABILITY: HOUSING INSECURITY: UNSAFE APPLIANCES: 0

## 2017-03-27 SDOH — ECONOMIC STABILITY: HOUSING INSECURITY: UNSAFE COOKING RANGE AREA: 0

## 2017-03-27 ASSESSMENT — ENCOUNTER SYMPTOMS
NAUSEA: DENIES ANY
VOMITING: DENIES ANY

## 2017-04-06 ENCOUNTER — HOME CARE VISIT (OUTPATIENT)
Dept: HOME HEALTH SERVICES | Facility: HOME HEALTHCARE | Age: 82
End: 2017-04-06
Payer: MEDICARE

## 2017-04-06 PROCEDURE — G0162 HHC RN E&M PLAN SVS, 15 MIN: HCPCS

## 2017-04-09 VITALS — TEMPERATURE: 97.9 F | RESPIRATION RATE: 16 BRPM

## 2017-04-09 SDOH — ECONOMIC STABILITY: HOUSING INSECURITY: UNSAFE COOKING RANGE AREA: 0

## 2017-04-09 SDOH — ECONOMIC STABILITY: HOUSING INSECURITY: UNSAFE APPLIANCES: 0

## 2017-04-09 ASSESSMENT — ACTIVITIES OF DAILY LIVING (ADL)
HOME_HEALTH_OASIS: 01
OASIS_M1830: 02